# Patient Record
Sex: MALE | Race: WHITE | NOT HISPANIC OR LATINO | Employment: OTHER | URBAN - METROPOLITAN AREA
[De-identification: names, ages, dates, MRNs, and addresses within clinical notes are randomized per-mention and may not be internally consistent; named-entity substitution may affect disease eponyms.]

---

## 2017-01-03 ENCOUNTER — OFFICE VISIT (OUTPATIENT)
Dept: ADDICTION MEDICINE | Facility: CLINIC | Age: 42
End: 2017-01-03
Payer: COMMERCIAL

## 2017-01-03 VITALS
BODY MASS INDEX: 25.26 KG/M2 | TEMPERATURE: 98.5 F | RESPIRATION RATE: 20 BRPM | HEART RATE: 77 BPM | SYSTOLIC BLOOD PRESSURE: 136 MMHG | DIASTOLIC BLOOD PRESSURE: 84 MMHG | OXYGEN SATURATION: 95 % | WEIGHT: 181 LBS

## 2017-01-03 DIAGNOSIS — F10.20 ALCOHOL USE DISORDER, SEVERE, DEPENDENCE (H): Primary | ICD-10-CM

## 2017-01-03 PROCEDURE — 36415 COLL VENOUS BLD VENIPUNCTURE: CPT | Performed by: FAMILY MEDICINE

## 2017-01-03 PROCEDURE — 80076 HEPATIC FUNCTION PANEL: CPT | Performed by: FAMILY MEDICINE

## 2017-01-03 PROCEDURE — 99203 OFFICE O/P NEW LOW 30 MIN: CPT | Performed by: FAMILY MEDICINE

## 2017-01-03 RX ORDER — NALTREXONE HYDROCHLORIDE 50 MG/1
50 TABLET, FILM COATED ORAL DAILY
Qty: 30 TABLET | Refills: 3 | Status: SHIPPED | OUTPATIENT
Start: 2017-01-03 | End: 2019-09-07

## 2017-01-03 NOTE — PROGRESS NOTES
SUBJECTIVE:                                                    Foreign Gleason is a 41 year old male who presents to clinic today for the following health issues:          ADDICTION MEDICINE NOTE:    INITIAL VISIT  REFERRED BY PRIMARY, WILFRIDO LUND, WHO DOES NOT FEEL COMFORTABLE PRESCRIBING NALTREXONE  ALCOHOL DEPENDENCE   RECENT [place] DETOX ADMISSION  SEE H+P AND DISCHARGE SUMMARY  DUE TO POOR INSURANCE IS UNABLE TO GO TO TREATMENT   GETTING INDIVIDUAL COUNSELING  GOING TO SOME AA BUT DOESN'T LIKE TO HEAR STORIES OF PEOPLE DESCRIBING HOW BAD THEY WERE  GIVEN INFO RE: A MEETING HE MAY LIKE    WAS PRESCRIBED NALTREXONE, GABAPENTIN, TRAZODONE  DISCUSSED USE OF EACH  WILL CONTINUE AS HE'S DOING WELL  NO CRAVING    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  Additional history: as documented    Patient Active Problem List   Diagnosis     Hypercholesteremia     Alcohol use disorder, severe, dependence (H)     Alcohol dependence with withdrawal with complication (H)     Anxiety disorder, unspecified     No past surgical history on file.    Social History   Substance Use Topics     Smoking status: Never Smoker      Smokeless tobacco: Not on file     Alcohol Use: Yes      Comment: 500 ml per day     No family history on file.      Current Outpatient Prescriptions   Medication Sig Dispense Refill     naltrexone (DEPADE;REVIA) 50 MG tablet Take 1 tablet (50 mg) by mouth daily 30 tablet 3     gabapentin (NEURONTIN) 300 MG capsule Take 1 capsule (300 mg) by mouth 3 times daily 90 capsule 0     traZODone (DESYREL) 50 MG tablet Take 1 tablet (50 mg) by mouth nightly as needed for sleep 30 tablet 0     [DISCONTINUED] naltrexone (DEPADE;REVIA) 50 MG tablet Take 1 tablet (50 mg) by mouth daily 30 tablet 0     No Known Allergies  Problem list, Medication list, Allergies,  and Medical/Social/Surgical histories reviewed in [facility] and updated as appropriate.    ROS:      OBJECTIVE:                                                    /84 mmHg  Pulse 77  Temp(Src) 98.5  F (36.9  C) (Oral)  Resp 20  Wt 181 lb (82.101 kg)  SpO2 95%  Body mass index is 25.26 kg/(m^2).     ROS:  Constitutional, HEENT, cardiovascular, pulmonary, gi and gu systems are negative, except as otherwise noted.    /84 mmHg  Pulse 77  Temp(Src) 98.5  F (36.9  C) (Oral)  Resp 20  Wt 181 lb (82.101 kg)  SpO2 95%  EXAM:  GENERAL APPEARANCE: healthy, alert and no distress  EYES: Eyes grossly normal to inspection, PERRL and conjunctivae and sclerae normal  NEURO: Normal strength and tone, mentation intact and speech normal  PSYCH: mentation appears normal and affect normal/bright  MENTAL STATUS EXAM:  Appearance/Behavior: No apparent distress and Casually groomed  Speech: Normal  Mood/Affect: normal affect  Insight: Adequate      Diagnostic Test Results:  No results found for this or any previous visit (from the past 24 hour(s)).     ASSESSMENT:                                                    ALCOHOL DEPENDENCE , UNCOMPLICATED    PLAN:                                                        ICD-10-CM    1. Alcohol use disorder, severe, dependence (H) F10.20 naltrexone (DEPADE;REVIA) 50 MG tablet     Hepatic panel           MEDICATIONS:   Orders Placed This Encounter   Medications     naltrexone (DEPADE;REVIA) 50 MG tablet     Sig: Take 1 tablet (50 mg) by mouth daily     Dispense:  30 tablet     Refill:  3          - Continue other medications without change  FUTURE APPOINTMENTS:       - Follow-up visit in 2 MONTHS    Jt Hernandez MD  [facility]

## 2017-01-03 NOTE — MR AVS SNAPSHOT
"              After Visit Summary   1/3/2017    Foreign Gleason    MRN: [id number]           Patient Information     Date Of Birth          1975        Visit Information        Provider Department      1/3/2017 10:15 AM Jt Hernandez MD [place]        Today's Diagnoses     Alcohol use disorder, severe, dependence (H)    -  1        Follow-ups after your visit        Who to contact     If you have questions or need follow up information about today's clinic visit or your schedule please contact [facility] directly at [phone].  Normal or non-critical lab and imaging results will be communicated to you by Music Messenger (MM)hart, letter or phone within 4 business days after the clinic has received the results. If you do not hear from us within 7 days, please contact the clinic through Music Messenger (MM)hart or phone. If you have a critical or abnormal lab result, we will notify you by phone as soon as possible.  Submit refill requests through LiquidFrameworks or call your pharmacy and they will forward the refill request to us. Please allow 3 business days for your refill to be completed.          Additional Information About Your Visit        MyChart Information     LiquidFrameworks lets you send messages to your doctor, view your test results, renew your prescriptions, schedule appointments and more. To sign up, go to [email or web address] . Click on \"Log in\" on the left side of the screen, which will take you to the Welcome page. Then click on \"Sign up Now\" on the right side of the page.     You will be asked to enter the access code listed below, as well as some personal information. Please follow the directions to create your username and password.     Your access code is: [email or web address]  Expires: 2017  4:16 PM     Your access code will  in 90 days. If you need help or a new code, please call your [facility] or [phone].        Care EveryWhere ID     This is your " Care EveryWhere ID. This could be used by other organizations to access your [facility] medical records  [id number]        Your Vitals Were     Pulse Temperature Respirations Pulse Oximetry          77 98.5  F (36.9  C) (Oral) 20 95%         Blood Pressure from Last 3 Encounters:   01/03/17 136/84   12/04/16 127/84    Weight from Last 3 Encounters:   01/03/17 181 lb (82.101 kg)   11/30/16 182 lb (82.555 kg)              We Performed the Following     Hepatic panel          Where to get your medicines      These medications were sent to [facility]/pharmacy #[id number] - [place], [place] - [address]  [address], [place] [address]     Phone:  [phone]    - naltrexone 50 MG tablet       Primary Care Provider    Physician No Ref-Primary       No address on file        Thank you!     Thank you for choosing [facility] PRIMARY [facility]ARE  for your care. Our goal is always to provide you with excellent care. Hearing back from our patients is one way we can continue to improve our services. Please take a few minutes to complete the written survey that you may receive in the mail after your visit with us. Thank you!             Your Updated Medication List - Protect others around you: Learn how to safely use, store and throw away your medicines at www.disposemymeds.org.          This list is accurate as of: 1/3/17 10:49 AM.  Always use your most recent med list.                   Brand Name Dispense Instructions for use    gabapentin 300 MG capsule    NEURONTIN    90 capsule    Take 1 capsule (300 mg) by mouth 3 times daily       naltrexone 50 MG tablet    DEPADE;REVIA    30 tablet    Take 1 tablet (50 mg) by mouth daily       traZODone 50 MG tablet    DESYREL    30 tablet    Take 1 tablet (50 mg) by mouth nightly as needed for sleep

## 2017-01-04 LAB
ALBUMIN SERPL-MCNC: 3.7 G/DL (ref 3.4–5)
ALP SERPL-CCNC: 48 U/L (ref 40–150)
ALT SERPL W P-5'-P-CCNC: 31 U/L (ref 0–70)
AST SERPL W P-5'-P-CCNC: 14 U/L (ref 0–45)
BILIRUB DIRECT SERPL-MCNC: 0.2 MG/DL (ref 0–0.2)
BILIRUB SERPL-MCNC: 0.9 MG/DL (ref 0.2–1.3)
PROT SERPL-MCNC: 6.1 G/DL (ref 6.8–8.8)

## 2017-01-12 ENCOUNTER — COMMUNICATION - HEALTHEAST (OUTPATIENT)
Dept: INTERNAL MEDICINE | Facility: CLINIC | Age: 42
End: 2017-01-12

## 2017-01-12 DIAGNOSIS — F10.20 ALCOHOL USE DISORDER, SEVERE, DEPENDENCE (H): Primary | ICD-10-CM

## 2017-01-12 RX ORDER — GABAPENTIN 300 MG/1
300 CAPSULE ORAL 3 TIMES DAILY
Qty: 90 CAPSULE | Refills: 0 | Status: SHIPPED | OUTPATIENT
Start: 2017-01-12 | End: 2017-02-02

## 2017-01-12 RX ORDER — TRAZODONE HYDROCHLORIDE 50 MG/1
50 TABLET, FILM COATED ORAL
Qty: 30 TABLET | Refills: 0 | Status: SHIPPED | OUTPATIENT
Start: 2017-01-12 | End: 2017-03-10

## 2017-01-12 NOTE — TELEPHONE ENCOUNTER
Fax received 01/12/17    gabapentin (NEURONTIN) 300 MG      Last Written Prescription Date: 12/02/16  Last Fill Quantity: 90,  # refills: 0   Last Office Visit with [facility] or  Health prescribing provider: 01/03/17      traZODone (DESYREL) 50 MG        Last Written Prescription Date: 12/02/16  Last Fill Quantity: 30; # refills: 0  Last Office Visit with [facility] or  Health prescribing provider:  01/03/17     Last PHQ-9 score on record= No flowsheet data found.    AST       14   1/3/2017  ALT       31   1/3/2017

## 2017-01-20 ENCOUNTER — OFFICE VISIT - HEALTHEAST (OUTPATIENT)
Dept: INTERNAL MEDICINE | Facility: CLINIC | Age: 42
End: 2017-01-20

## 2017-01-20 DIAGNOSIS — F10.10 ALCOHOL ABUSE: ICD-10-CM

## 2017-01-20 ASSESSMENT — MIFFLIN-ST. JEOR: SCORE: 1696.39

## 2017-02-02 ENCOUNTER — COMMUNICATION - HEALTHEAST (OUTPATIENT)
Dept: INTERNAL MEDICINE | Facility: CLINIC | Age: 42
End: 2017-02-02

## 2017-02-02 DIAGNOSIS — F10.20 ALCOHOL USE DISORDER, SEVERE, DEPENDENCE (H): Primary | ICD-10-CM

## 2017-02-02 RX ORDER — GABAPENTIN 300 MG/1
300 CAPSULE ORAL 3 TIMES DAILY
Qty: 90 CAPSULE | Refills: 0 | Status: SHIPPED
Start: 2017-02-02 | End: 2017-03-03

## 2017-02-02 NOTE — TELEPHONE ENCOUNTER
Fax received from [facility] requesting a 90-day supply. Please fax form to [phone]  Writer placed form in provider's folder    gabapentin (NEURONTIN) 300 MG      Last Written Prescription Date: 01/12/16  Last Fill Quantity: 90,  # refills: 0   Last Office Visit with [facility], P or [facility] prescribing provider: 01/03/16                                         Next 5 appointments (look out 90 days)     Feb 28, 2017 10:00 AM   Return Visit with Jt Hernandez MD   [facility] ([facility])    [address] [place] [address]  [address]  [facility] [address] [phone]

## 2017-02-02 NOTE — TELEPHONE ENCOUNTER
Staff faxed signed Rx Request form for Gabapentin 300MG capsules to [facility] at [phone].      Pam Mendez MA

## 2017-02-10 ENCOUNTER — TELEPHONE (OUTPATIENT)
Dept: FAMILY MEDICINE | Facility: CLINIC | Age: 42
End: 2017-02-10

## 2017-02-10 NOTE — TELEPHONE ENCOUNTER
[facility].  [facility] stated that they received a order from Dr. Hernandez OK'ing a 90 day supply with 4 refills of Gabapentin.    They didn't have the instruction on hoe to take the medication and called to get them.      Writer reviewed Pt's cahrt and informed them it is 300 mg three times per day.      Will forward to Dr. Hernandez to OK refill as 270 caps (90 day Supply) with 4 refills.      Dr. Hernandez is this what you wanted pt to get?  Writer will call [facility] back after approval from you.    Navarro Gray RN

## 2017-02-10 NOTE — TELEPHONE ENCOUNTER
Incoming call from [employer] requesting instructions on Gabapentin rx written by Dr Hernandez 02/02/17.   Please follow up. Contact info: [phone]. Reference #: [id number]. Please follow up by the end of the day 02/13/17.    Joelle Dias

## 2017-02-21 ENCOUNTER — OFFICE VISIT - HEALTHEAST (OUTPATIENT)
Dept: INTERNAL MEDICINE | Facility: CLINIC | Age: 42
End: 2017-02-21

## 2017-02-21 DIAGNOSIS — F19.188: ICD-10-CM

## 2017-02-21 DIAGNOSIS — F10.10 ALCOHOL ABUSE: ICD-10-CM

## 2017-02-21 ASSESSMENT — MIFFLIN-ST. JEOR: SCORE: 1687.32

## 2017-02-28 ENCOUNTER — OFFICE VISIT (OUTPATIENT)
Dept: ADDICTION MEDICINE | Facility: CLINIC | Age: 42
End: 2017-02-28
Payer: COMMERCIAL

## 2017-02-28 VITALS
WEIGHT: 182.5 LBS | TEMPERATURE: 98.2 F | SYSTOLIC BLOOD PRESSURE: 142 MMHG | BODY MASS INDEX: 25.45 KG/M2 | OXYGEN SATURATION: 97 % | HEART RATE: 86 BPM | RESPIRATION RATE: 18 BRPM | DIASTOLIC BLOOD PRESSURE: 82 MMHG

## 2017-02-28 DIAGNOSIS — F10.20 ALCOHOL USE DISORDER, SEVERE, DEPENDENCE (H): Primary | ICD-10-CM

## 2017-02-28 PROCEDURE — 99214 OFFICE O/P EST MOD 30 MIN: CPT | Performed by: FAMILY MEDICINE

## 2017-02-28 NOTE — PROGRESS NOTES
SUBJECTIVE:                                                    Foreign Gleason is a 41 year old male who presents to clinic today for the following health issues:          ADDICTION MEDICINE NOTE:    I  REFERRED BY PRIMARY, WILFRIDO LUND, WHO DOES NOT FEEL COMFORTABLE PRESCRIBING NALTREXONE  ALCOHOL DEPENDENCE       DUE TO POOR INSURANCE IS UNABLE TO GO TO TREATMENT   GETTING INDIVIDUAL COUNSELING  GOING TO SOME AA BUT DOESN'T LIKE TO HEAR STORIES OF PEOPLE DESCRIBING HOW BAD THEY WERE  GIVEN INFO RE: A MEETING HE MAY LIKE    WAS PRESCRIBED NALTREXONE, GABAPENTIN, TRAZODONE  DISCUSSED USE OF EACH  WILL CONTINUE AS HE'S DOING WELL  NO CRAVING    NALTREXONE HELPING; REDUCED CRAVING  AGAIN DISCUSSED HOW IT WORKS  DISCUSSED CONCEPT OF MEMORY OF ALCOHOL FADING WITH TIME, THEN CRAVING AND DESIRE WILL FADE ALSO  GOES TO OCCASIONAL AA  METS WITH SPONSOR  INVOLVED WITH  CONCERNED FOR     GABAPENTIN NOW LESS NECESSARY AS POST -ACUTE WITHDRAWAL PERIOD FADING  SOME NAUSEA  WILL REDUCE DOSE  MG DAILY    CONTINUE SAME   RE-CHECK 3 MONTHS    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  Additional history: as documented    Patient Active Problem List   Diagnosis     Hypercholesteremia     Alcohol use disorder, severe, dependence (H)     Alcohol dependence with withdrawal with complication (H)     Anxiety disorder, unspecified     No past surgical history on file.    Social History   Substance Use Topics     Smoking status: Never Smoker     Smokeless tobacco: Not on file     Alcohol use Yes      Comment: 500 ml per day     No family history on file.      Current Outpatient Prescriptions   Medication Sig Dispense Refill     gabapentin (NEURONTIN) 300 MG capsule Take 1 capsule (300 mg) by mouth 3 times daily 90 capsule 0     traZODone (DESYREL) 50 MG tablet Take 1 tablet (50 mg) by mouth nightly as needed for sleep  30 tablet 0     naltrexone (DEPADE;REVIA) 50 MG tablet Take 1 tablet (50 mg) by mouth daily 30 tablet 3     No Known Allergies  Problem list, Medication list, Allergies, and Medical/Social/Surgical histories reviewed in [facility] and updated as appropriate.    ROS:      OBJECTIVE:                                                    /82  Pulse 86  Temp 98.2  F (36.8  C) (Oral)  Resp 18  Wt 182 lb 8 oz (82.8 kg)  SpO2 97%  BMI 25.45 kg/m2  Body mass index is 25.45 kg/(m^2).     ROS:  Constitutional, HEENT, cardiovascular, pulmonary, gi and gu systems are negative, except as otherwise noted.    /82  Pulse 86  Temp 98.2  F (36.8  C) (Oral)  Resp 18  Wt 182 lb 8 oz (82.8 kg)  SpO2 97%  BMI 25.45 kg/m2  EXAM:  GENERAL APPEARANCE: healthy, alert and no distress  EYES: Eyes grossly normal to inspection, PERRL and conjunctivae and sclerae normal  NEURO: Normal strength and tone, mentation intact and speech normal  PSYCH: mentation appears normal and affect normal/bright  MENTAL STATUS EXAM:  Appearance/Behavior: No apparent distress and Casually groomed  Speech: Normal  Mood/Affect: normal affect  Insight: Adequate      Diagnostic Test Results:  No results found for this or any previous visit (from the past 24 hour(s)).     ASSESSMENT:                                                    ALCOHOL DEPENDENCE , UNCOMPLICATED    PLAN:                                                      No diagnosis found.        MEDICATIONS:   No orders of the defined types were placed in this encounter.         - Continue other medications without change  FUTURE APPOINTMENTS:       - Follow-up visit in 3 MONTHS    Jt Hernandez MD  [facility]

## 2017-02-28 NOTE — MR AVS SNAPSHOT
"              After Visit Summary   2017    Foreign Gleason    MRN: [id number]           Patient Information     Date Of Birth          1975        Visit Information        Provider Department      2017 10:00 AM Jt Hernandez MD [facility]         Follow-ups after your visit        Who to contact     If you have questions or need follow up information about today's clinic visit or your schedule please contact [facility] PRIMARY [facility] directly at [phone].  Normal or non-critical lab and imaging results will be communicated to you by MyChart, letter or phone within 4 business days after the clinic has received the results. If you do not hear from us within 7 days, please contact the clinic through MyChart or phone. If you have a critical or abnormal lab result, we will notify you by phone as soon as possible.  Submit refill requests through Outitude or call your pharmacy and they will forward the refill request to us. Please allow 3 business days for your refill to be completed.          Additional Information About Your Visit        MyChart Information     Outitude lets you send messages to your doctor, view your test results, renew your prescriptions, schedule appointments and more. To sign up, go to [email or web address] . Click on \"Log in\" on the left side of the screen, which will take you to the Welcome page. Then click on \"Sign up Now\" on the right side of the page.     You will be asked to enter the access code listed below, as well as some personal information. Please follow the directions to create your username and password.     Your access code is: [email or web address]  Expires: 2017  4:16 PM     Your access code will  in 90 days. If you need help or a new code, please call your [facility] or [phone].        Care EveryWhere ID     This is your Care EveryWhere ID. This could be used by other organizations to access your " [facility] medical records  [id number]        Your Vitals Were     Pulse Temperature Respirations Pulse Oximetry BMI (Body Mass Index)       86 98.2  F (36.8  C) (Oral) 18 97% 25.45 kg/m2        Blood Pressure from Last 3 Encounters:   02/28/17 142/82   01/03/17 136/84    Weight from Last 3 Encounters:   02/28/17 182 lb 8 oz (82.8 kg)   01/03/17 181 lb (82.1 kg)              Today, you had the following     No orders found for display       Primary Care Provider    Physician No Ref-Primary       No address on file        Thank you!     Thank you for choosing [facility]ARE  for your care. Our goal is always to provide you with excellent care. Hearing back from our patients is one way we can continue to improve our services. Please take a few minutes to complete the written survey that you may receive in the mail after your visit with us. Thank you!             Your Updated Medication List - Protect others around you: Learn how to safely use, store and throw away your medicines at www.disposemymeds.org.          This list is accurate as of: 2/28/17 10:25 AM.  Always use your most recent med list.                   Brand Name Dispense Instructions for use    gabapentin 300 MG capsule    NEURONTIN    90 capsule    Take 1 capsule (300 mg) by mouth 3 times daily       naltrexone 50 MG tablet    DEPADE;REVIA    30 tablet    Take 1 tablet (50 mg) by mouth daily       traZODone 50 MG tablet    DESYREL    30 tablet    Take 1 tablet (50 mg) by mouth nightly as needed for sleep

## 2017-02-28 NOTE — NURSING NOTE
"No chief complaint on file.      Initial /82  Pulse 86  Temp 98.2  F (36.8  C) (Oral)  Resp 18  Wt 182 lb 8 oz (82.8 kg)  SpO2 97%  BMI 25.45 kg/m2 Estimated body mass index is 25.45 kg/(m^2) as calculated from the following:    Height as of 11/30/16: 5' 11\" (1.803 m).    Weight as of this encounter: 182 lb 8 oz (82.8 kg).  Medication Reconciliation: incomplete     Casi Hill MA      "

## 2017-03-03 DIAGNOSIS — F10.20 ALCOHOL USE DISORDER, SEVERE, DEPENDENCE (H): ICD-10-CM

## 2017-03-03 NOTE — TELEPHONE ENCOUNTER
gabapentin (NEURONTIN) 300 MG capsule  Controlled Substance Refill Request    Last refill: 2/2/17, Picked up from pharmacy 2/14/17 Per [place]    Last clinic visit: 2/28/17    Next appt: 5/30/17    Controlled substance agreement on file: No.    Documentation in problem list reviewed:  Yes    Processing:  E[facility]Sc[facility]    RX [place] ([place] reviewed:  reviewed- no concerns  [place]:  [email or web address]    Navarro Gray RN

## 2017-03-03 NOTE — TELEPHONE ENCOUNTER
Fax received 03/03/17    gabapentin (NEURONTIN) 300 MG      Last Written Prescription Date: 02/02/17  Last Fill Quantity: 90,  # refills: 0   Last Office Visit with G, P or [facility] prescribing provider: 02/28/17                                         Next 5 appointments (look out 90 days)     May 30, 2017 10:30 AM CDT   Return Visit with Jt Hernandez MD   [facility] ([facility])    [address] [place] [address]  [address]  [facility] [id number] [phone]

## 2017-03-05 RX ORDER — GABAPENTIN 300 MG/1
300 CAPSULE ORAL 3 TIMES DAILY
Qty: 90 CAPSULE | Refills: 0 | Status: SHIPPED | OUTPATIENT
Start: 2017-03-05 | End: 2017-04-25

## 2017-03-10 DIAGNOSIS — F10.20 ALCOHOL USE DISORDER, SEVERE, DEPENDENCE (H): ICD-10-CM

## 2017-03-10 RX ORDER — TRAZODONE HYDROCHLORIDE 50 MG/1
50 TABLET, FILM COATED ORAL
Qty: 30 TABLET | Refills: 0 | Status: SHIPPED | OUTPATIENT
Start: 2017-03-10 | End: 2019-09-07

## 2017-03-10 NOTE — TELEPHONE ENCOUNTER
Fax received 03/10/17    traZODone (DESYREL) 50 MG       Last Written Prescription Date: 01/12/17  Last Fill Quantity: 30; # refills: 0  Last Office Visit with [facility], P or [facility] prescribing provider:  02/28/17   Next 5 appointments (look out 90 days)     May 30, 2017 10:30 AM CDT   Return Visit with Jt Hernandez MD   [facility] ([facility])    [address] [place] [address]  [address]  [facility] [address] [phone]                   Last PHQ-9 score on record= No flowsheet data found.    Lab Results   Component Value Date    AST 14 01/03/2017     Lab Results   Component Value Date    ALT 31 01/03/2017

## 2017-03-10 NOTE — TELEPHONE ENCOUNTER
Routing refill request to provider for review/approval because:  Diagnosis not on the [facility] refill protocol Alcohol use disorder, severe, dependence (H) [F10.20]  - Primary     Thank you,  Carlos Montiel RN

## 2017-04-25 DIAGNOSIS — F10.20 ALCOHOL USE DISORDER, SEVERE, DEPENDENCE (H): ICD-10-CM

## 2017-04-26 RX ORDER — GABAPENTIN 300 MG/1
CAPSULE ORAL
Qty: 90 CAPSULE | Refills: 0 | Status: SHIPPED | OUTPATIENT
Start: 2017-04-26 | End: 2019-09-07

## 2017-04-26 NOTE — TELEPHONE ENCOUNTER
Controlled Substance Refill Request for Gabapentin    Last refill: 3/5/17, 90 tablets, 0 refills    Last clinic visit: 2/28/17     Next appt: 5/30/17    Controlled substance agreement on file: No.    Documentation in problem list reviewed:  Yes    Processing:  [facility] Rx [facility]    RX [place] reviewed:  reviewed- recommended provider review, it appears pt received 90 day supply of gabapentin from another provider, provider not listed  [place] profile:  [email or web address]  Thank you!  Kim Hopper RN

## 2017-05-15 ENCOUNTER — OFFICE VISIT - HEALTHEAST (OUTPATIENT)
Dept: INTERNAL MEDICINE | Facility: CLINIC | Age: 42
End: 2017-05-15

## 2017-05-15 DIAGNOSIS — F10.10 ALCOHOL ABUSE: ICD-10-CM

## 2017-05-15 DIAGNOSIS — N52.9 ED (ERECTILE DYSFUNCTION): ICD-10-CM

## 2017-05-15 ASSESSMENT — MIFFLIN-ST. JEOR: SCORE: 1728.14

## 2017-05-28 DIAGNOSIS — F10.20 ALCOHOL USE DISORDER, SEVERE, DEPENDENCE (H): ICD-10-CM

## 2017-05-29 DIAGNOSIS — F10.20 ALCOHOL USE DISORDER, SEVERE, DEPENDENCE (H): ICD-10-CM

## 2017-05-30 RX ORDER — GABAPENTIN 300 MG/1
CAPSULE ORAL
Qty: 90 CAPSULE | Refills: 0 | OUTPATIENT
Start: 2017-05-30

## 2017-05-30 RX ORDER — NALTREXONE HYDROCHLORIDE 50 MG/1
TABLET, FILM COATED ORAL
Qty: 30 TABLET | Refills: 3 | OUTPATIENT
Start: 2017-05-30

## 2017-05-30 NOTE — TELEPHONE ENCOUNTER
Naltrexone     Last Written Prescription Date: 1/3/17  Last Fill Quantity: 30,  # refills: 3   Last Office Visit with G, P or [facility] prescribing provider: 2/28/17                                            Thank you!  Kim Hopper RN

## 2017-05-30 NOTE — TELEPHONE ENCOUNTER
Controlled Substance Refill Request for Gabapentin    Last refill: 4/26/17, 90 tablets     Last clinic visit: 2/28/17    Next appt: N/A, cancelled appt scheduled for today     Controlled substance agreement on file: No.    Documentation in problem list reviewed:  Yes    Processing:  Fax Rx to pt's pharmacy    RX [place] reviewed:  reviewed- no concerns  [place] profile:  [email or web address]  Thank you!  Kim Hopper RN

## 2017-07-03 ENCOUNTER — COMMUNICATION - HEALTHEAST (OUTPATIENT)
Dept: INTERNAL MEDICINE | Facility: CLINIC | Age: 42
End: 2017-07-03

## 2018-05-05 ENCOUNTER — COMMUNICATION - HEALTHEAST (OUTPATIENT)
Dept: INTERNAL MEDICINE | Facility: CLINIC | Age: 43
End: 2018-05-05

## 2018-05-05 DIAGNOSIS — N52.9 ERECTILE DYSFUNCTION: ICD-10-CM

## 2018-07-20 ENCOUNTER — COMMUNICATION - HEALTHEAST (OUTPATIENT)
Dept: INTERNAL MEDICINE | Facility: CLINIC | Age: 43
End: 2018-07-20

## 2018-07-20 DIAGNOSIS — N52.9 ERECTILE DYSFUNCTION: ICD-10-CM

## 2018-09-20 ENCOUNTER — COMMUNICATION - HEALTHEAST (OUTPATIENT)
Dept: INTERNAL MEDICINE | Facility: CLINIC | Age: 43
End: 2018-09-20

## 2018-09-20 DIAGNOSIS — N52.9 ERECTILE DYSFUNCTION: ICD-10-CM

## 2018-11-13 ENCOUNTER — COMMUNICATION - HEALTHEAST (OUTPATIENT)
Dept: INTERNAL MEDICINE | Facility: CLINIC | Age: 43
End: 2018-11-13

## 2018-11-13 DIAGNOSIS — N52.9 ERECTILE DYSFUNCTION: ICD-10-CM

## 2019-09-07 ENCOUNTER — HOSPITAL ENCOUNTER (INPATIENT)
Facility: CLINIC | Age: 44
LOS: 2 days | Discharge: HOME | DRG: 897 | End: 2019-09-09
Attending: EMERGENCY MEDICINE | Admitting: PSYCHIATRY & NEUROLOGY
Payer: COMMERCIAL

## 2019-09-07 DIAGNOSIS — F41.9 ANXIETY DISORDER, UNSPECIFIED TYPE: ICD-10-CM

## 2019-09-07 DIAGNOSIS — F10.29 ALCOHOL DEPENDENCE WITH UNSPECIFIED ALCOHOL-INDUCED DISORDER (H): ICD-10-CM

## 2019-09-07 DIAGNOSIS — I10 BENIGN ESSENTIAL HYPERTENSION: Primary | ICD-10-CM

## 2019-09-07 PROBLEM — F10.939 ALCOHOL WITHDRAWAL WITH COMPLICATION WITH INPATIENT TREATMENT (H): Status: ACTIVE | Noted: 2019-09-07

## 2019-09-07 LAB
ALCOHOL BREATH TEST: 0.07 (ref 0–0.01)
AMPHETAMINES UR QL SCN: NEGATIVE
ANION GAP SERPL CALCULATED.3IONS-SCNC: 8 MMOL/L (ref 3–14)
BARBITURATES UR QL: NEGATIVE
BASOPHILS # BLD AUTO: 0 10E9/L (ref 0–0.2)
BASOPHILS NFR BLD AUTO: 1 %
BENZODIAZ UR QL: POSITIVE
BUN SERPL-MCNC: 10 MG/DL (ref 7–30)
CALCIUM SERPL-MCNC: 9.6 MG/DL (ref 8.5–10.1)
CANNABINOIDS UR QL SCN: NEGATIVE
CHLORIDE SERPL-SCNC: 100 MMOL/L (ref 94–109)
CO2 SERPL-SCNC: 30 MMOL/L (ref 20–32)
COCAINE UR QL: NEGATIVE
CREAT SERPL-MCNC: 0.74 MG/DL (ref 0.66–1.25)
DIFFERENTIAL METHOD BLD: ABNORMAL
EOSINOPHIL # BLD AUTO: 0.1 10E9/L (ref 0–0.7)
EOSINOPHIL NFR BLD AUTO: 1.8 %
ERYTHROCYTE [DISTWIDTH] IN BLOOD BY AUTOMATED COUNT: 11.7 % (ref 10–15)
ETHANOL UR QL SCN: NEGATIVE
GFR SERPL CREATININE-BSD FRML MDRD: >90 ML/MIN/{1.73_M2}
GGT SERPL-CCNC: 860 U/L (ref 0–75)
GLUCOSE SERPL-MCNC: 94 MG/DL (ref 70–99)
HCT VFR BLD AUTO: 45.8 % (ref 40–53)
HGB BLD-MCNC: 16.2 G/DL (ref 13.3–17.7)
IMM GRANULOCYTES # BLD: 0 10E9/L (ref 0–0.4)
IMM GRANULOCYTES NFR BLD: 0.3 %
LYMPHOCYTES # BLD AUTO: 1 10E9/L (ref 0.8–5.3)
LYMPHOCYTES NFR BLD AUTO: 26.5 %
MCH RBC QN AUTO: 34.8 PG (ref 26.5–33)
MCHC RBC AUTO-ENTMCNC: 35.4 G/DL (ref 31.5–36.5)
MCV RBC AUTO: 98 FL (ref 78–100)
MONOCYTES # BLD AUTO: 0.3 10E9/L (ref 0–1.3)
MONOCYTES NFR BLD AUTO: 7.7 %
NEUTROPHILS # BLD AUTO: 2.4 10E9/L (ref 1.6–8.3)
NEUTROPHILS NFR BLD AUTO: 62.7 %
NRBC # BLD AUTO: 0 10*3/UL
NRBC BLD AUTO-RTO: 0 /100
OPIATES UR QL SCN: NEGATIVE
PLATELET # BLD AUTO: 157 10E9/L (ref 150–450)
POTASSIUM SERPL-SCNC: 4.1 MMOL/L (ref 3.4–5.3)
RBC # BLD AUTO: 4.66 10E12/L (ref 4.4–5.9)
SODIUM SERPL-SCNC: 138 MMOL/L (ref 133–144)
WBC # BLD AUTO: 3.9 10E9/L (ref 4–11)

## 2019-09-07 PROCEDURE — 82977 ASSAY OF GGT: CPT | Performed by: EMERGENCY MEDICINE

## 2019-09-07 PROCEDURE — 99285 EMERGENCY DEPT VISIT HI MDM: CPT | Mod: 25 | Performed by: EMERGENCY MEDICINE

## 2019-09-07 PROCEDURE — 80307 DRUG TEST PRSMV CHEM ANLYZR: CPT | Performed by: EMERGENCY MEDICINE

## 2019-09-07 PROCEDURE — 25000132 ZZH RX MED GY IP 250 OP 250 PS 637: Performed by: PSYCHIATRY & NEUROLOGY

## 2019-09-07 PROCEDURE — 86709 HEPATITIS A IGM ANTIBODY: CPT | Performed by: EMERGENCY MEDICINE

## 2019-09-07 PROCEDURE — 80076 HEPATIC FUNCTION PANEL: CPT | Performed by: EMERGENCY MEDICINE

## 2019-09-07 PROCEDURE — 82075 ASSAY OF BREATH ETHANOL: CPT | Performed by: EMERGENCY MEDICINE

## 2019-09-07 PROCEDURE — 84443 ASSAY THYROID STIM HORMONE: CPT | Performed by: EMERGENCY MEDICINE

## 2019-09-07 PROCEDURE — 83735 ASSAY OF MAGNESIUM: CPT | Performed by: EMERGENCY MEDICINE

## 2019-09-07 PROCEDURE — 80320 DRUG SCREEN QUANTALCOHOLS: CPT | Performed by: EMERGENCY MEDICINE

## 2019-09-07 PROCEDURE — HZ2ZZZZ DETOXIFICATION SERVICES FOR SUBSTANCE ABUSE TREATMENT: ICD-10-PCS | Performed by: PSYCHIATRY & NEUROLOGY

## 2019-09-07 PROCEDURE — 12800008 ZZH R&B CD ADULT

## 2019-09-07 PROCEDURE — 86704 HEP B CORE ANTIBODY TOTAL: CPT | Performed by: EMERGENCY MEDICINE

## 2019-09-07 PROCEDURE — 25000132 ZZH RX MED GY IP 250 OP 250 PS 637: Performed by: HOSPITALIST

## 2019-09-07 PROCEDURE — 99283 EMERGENCY DEPT VISIT LOW MDM: CPT | Mod: Z6 | Performed by: EMERGENCY MEDICINE

## 2019-09-07 PROCEDURE — 80048 BASIC METABOLIC PNL TOTAL CA: CPT | Performed by: EMERGENCY MEDICINE

## 2019-09-07 PROCEDURE — 87340 HEPATITIS B SURFACE AG IA: CPT | Performed by: EMERGENCY MEDICINE

## 2019-09-07 PROCEDURE — 25000132 ZZH RX MED GY IP 250 OP 250 PS 637: Performed by: EMERGENCY MEDICINE

## 2019-09-07 PROCEDURE — 85025 COMPLETE CBC W/AUTO DIFF WBC: CPT | Performed by: EMERGENCY MEDICINE

## 2019-09-07 PROCEDURE — 86706 HEP B SURFACE ANTIBODY: CPT | Performed by: EMERGENCY MEDICINE

## 2019-09-07 PROCEDURE — 86803 HEPATITIS C AB TEST: CPT | Performed by: EMERGENCY MEDICINE

## 2019-09-07 RX ORDER — ATENOLOL 50 MG/1
50 TABLET ORAL DAILY PRN
Status: DISCONTINUED | OUTPATIENT
Start: 2019-09-07 | End: 2019-09-07

## 2019-09-07 RX ORDER — DIAZEPAM 10 MG
10 TABLET ORAL ONCE
Status: COMPLETED | OUTPATIENT
Start: 2019-09-07 | End: 2019-09-07

## 2019-09-07 RX ORDER — ALUMINA, MAGNESIA, AND SIMETHICONE 2400; 2400; 240 MG/30ML; MG/30ML; MG/30ML
30 SUSPENSION ORAL EVERY 4 HOURS PRN
Status: DISCONTINUED | OUTPATIENT
Start: 2019-09-07 | End: 2019-09-09 | Stop reason: HOSPADM

## 2019-09-07 RX ORDER — MULTIPLE VITAMINS W/ MINERALS TAB 9MG-400MCG
1 TAB ORAL DAILY
Status: DISCONTINUED | OUTPATIENT
Start: 2019-09-07 | End: 2019-09-09 | Stop reason: HOSPADM

## 2019-09-07 RX ORDER — HYDROXYZINE HYDROCHLORIDE 25 MG/1
25 TABLET, FILM COATED ORAL EVERY 4 HOURS PRN
Status: DISCONTINUED | OUTPATIENT
Start: 2019-09-07 | End: 2019-09-09 | Stop reason: HOSPADM

## 2019-09-07 RX ORDER — DIPHENHYDRAMINE HCL 25 MG
50 TABLET ORAL
COMMUNITY

## 2019-09-07 RX ORDER — ACETAMINOPHEN 325 MG/1
650 TABLET ORAL EVERY 4 HOURS PRN
Status: DISCONTINUED | OUTPATIENT
Start: 2019-09-07 | End: 2019-09-09 | Stop reason: HOSPADM

## 2019-09-07 RX ORDER — DIPHENHYDRAMINE HCL 25 MG
50 CAPSULE ORAL
Status: DISCONTINUED | OUTPATIENT
Start: 2019-09-07 | End: 2019-09-09 | Stop reason: HOSPADM

## 2019-09-07 RX ORDER — LOPERAMIDE HCL 2 MG
2 CAPSULE ORAL 4 TIMES DAILY PRN
Status: DISCONTINUED | OUTPATIENT
Start: 2019-09-07 | End: 2019-09-09 | Stop reason: HOSPADM

## 2019-09-07 RX ORDER — ATENOLOL 50 MG/1
50 TABLET ORAL DAILY PRN
Status: DISCONTINUED | OUTPATIENT
Start: 2019-09-07 | End: 2019-09-09 | Stop reason: HOSPADM

## 2019-09-07 RX ORDER — DIAZEPAM 5 MG
5-20 TABLET ORAL EVERY 30 MIN PRN
Status: DISCONTINUED | OUTPATIENT
Start: 2019-09-07 | End: 2019-09-09 | Stop reason: HOSPADM

## 2019-09-07 RX ORDER — LANOLIN ALCOHOL/MO/W.PET/CERES
100 CREAM (GRAM) TOPICAL DAILY
Status: DISCONTINUED | OUTPATIENT
Start: 2019-09-07 | End: 2019-09-09 | Stop reason: HOSPADM

## 2019-09-07 RX ORDER — CLONIDINE HYDROCHLORIDE 0.1 MG/1
0.1 TABLET ORAL 2 TIMES DAILY
Status: COMPLETED | OUTPATIENT
Start: 2019-09-07 | End: 2019-09-08

## 2019-09-07 RX ORDER — FOLIC ACID 1 MG/1
1 TABLET ORAL DAILY
Status: DISCONTINUED | OUTPATIENT
Start: 2019-09-07 | End: 2019-09-09 | Stop reason: HOSPADM

## 2019-09-07 RX ORDER — BISACODYL 10 MG
10 SUPPOSITORY, RECTAL RECTAL DAILY PRN
Status: DISCONTINUED | OUTPATIENT
Start: 2019-09-07 | End: 2019-09-09 | Stop reason: HOSPADM

## 2019-09-07 RX ADMIN — DIAZEPAM 10 MG: 10 TABLET ORAL at 12:19

## 2019-09-07 RX ADMIN — DIAZEPAM 10 MG: 5 TABLET ORAL at 16:44

## 2019-09-07 RX ADMIN — DIPHENHYDRAMINE HYDROCHLORIDE 50 MG: 25 CAPSULE ORAL at 22:07

## 2019-09-07 RX ADMIN — MULTIPLE VITAMINS W/ MINERALS TAB 1 TABLET: TAB at 18:04

## 2019-09-07 RX ADMIN — CLONIDINE HYDROCHLORIDE 0.1 MG: 0.1 TABLET ORAL at 22:07

## 2019-09-07 RX ADMIN — FOLIC ACID 1 MG: 1 TABLET ORAL at 18:08

## 2019-09-07 RX ADMIN — Medication 100 MG: at 18:05

## 2019-09-07 RX ADMIN — DIAZEPAM 10 MG: 5 TABLET ORAL at 20:45

## 2019-09-07 ASSESSMENT — ACTIVITIES OF DAILY LIVING (ADL)
HYGIENE/GROOMING: INDEPENDENT
DRESS: STREET CLOTHES;INDEPENDENT
ORAL_HYGIENE: INDEPENDENT
LAUNDRY: WITH SUPERVISION

## 2019-09-07 NOTE — ED NOTES
ED to Behavioral Floor Handoff    SITUATION  Foreign Gleason is a 44 year old male who speaks English and lives in a home with family members The patient arrived in the ED by private car from home with a complaint of Alcohol Problem (Here for detox, frinks 500-600 ml vodka/day, last drink was at 1 am today, no Seizure hx. Patient has a bed in detox on hold for him.)  .The patient's current symptoms started/worsened 5 hour(s) ago and during this time the symptoms have increased.   In the ED, pt was diagnosed with   Final diagnoses:   Alcohol dependence with unspecified alcohol-induced disorder (H)        Initial vitals were: BP: (!) 168/109  Pulse: 88  Temp: 98.4  F (36.9  C)  Resp: 16  Weight: 88.1 kg (194 lb 3.2 oz)  SpO2: 98 %   --------  Is the patient diabetic? No   If yes, last blood glucose? --     If yes, was this treated in the ED? --  --------  Is the patient inebriated (ETOH) no   or Impaired on other substances? No  MSSA done? Yes  Last MSSA score: -- 5   Were withdrawal symptoms treated? Yes  Does the patient have a seizure history? No. If yes, date of most recent seizure--  --------  Is the patient patient experiencing suicidal ideation? denies current or recent suicidal ideation     Homicidal ideation? denies current or recent homicidal ideation or behaviors.    Self-injurious behavior/urges? denies current or recent self injurious behavior or ideation.  ------  Was pt aggressive in the ED No  Was a code called No  Is the pt now cooperative? Yes  -------  Meds given in ED:   Medications   diazepam (VALIUM) tablet 10 mg (10 mg Oral Given 9/7/19 1219)      Family present during ED course? No  Family currently present? No    BACKGROUND  Does the patient have a cognitive impairment or developmental disability? No  Allergies: No Known Allergies.   Social demographics are   Social History     Socioeconomic History     Marital status:      Spouse name: None     Number of children: None     Years of  education: None     Highest education level: None   Occupational History     None   Social Needs     Financial resource strain: None     Food insecurity:     Worry: None     Inability: None     Transportation needs:     Medical: None     Non-medical: None   Tobacco Use     Smoking status: Never Smoker     Smokeless tobacco: Never Used   Substance and Sexual Activity     Alcohol use: Yes     Comment: 500 ml per day vodka     Drug use: No     Sexual activity: None   Lifestyle     Physical activity:     Days per week: None     Minutes per session: None     Stress: None   Relationships     Social connections:     Talks on phone: None     Gets together: None     Attends Confucianist service: None     Active member of club or organization: None     Attends meetings of clubs or organizations: None     Relationship status: None     Intimate partner violence:     Fear of current or ex partner: None     Emotionally abused: None     Physically abused: None     Forced sexual activity: None   Other Topics Concern     None   Social History Narrative     None        ASSESSMENT  Labs results   Labs Ordered and Resulted from Time of ED Arrival Up to the Time of Departure from the ED   CBC WITH PLATELETS DIFFERENTIAL - Abnormal; Notable for the following components:       Result Value    WBC 3.9 (*)     MCH 34.8 (*)     All other components within normal limits   ALCOHOL BREATH TEST POCT - Abnormal; Notable for the following components:    Alcohol Breath Test 0.069 (*)     All other components within normal limits   BASIC METABOLIC PANEL      Imaging Studies: No results found for this or any previous visit (from the past 24 hour(s)).   Most recent vital signs BP (!) 160/114   Pulse 98   Temp 97.6  F (36.4  C) (Oral)   Resp 16   Wt 88.1 kg (194 lb 3.2 oz)   SpO2 95%   BMI 27.09 kg/m     Abnormal labs/tests/findings requiring intervention:---   Pain control: pt had none  Nausea control: pt had none    RECOMMENDATION  Are any  infection precautions needed (MRSA, VRE, etc.)? No If yes, what infection? --  ---  Does the patient have mobility issues? independently. If yes, what device does the pt use? ---  ---  Is patient on 72 hour hold or commitment? No If on 72 hour hold, have hold and rights been given to patient? N/A  Are admitting orders written if after 10 p.m. ?N/A  Tasks needing to be completed:---     Mag Liriano RN   [facility]--  none  [phone] [facility]

## 2019-09-07 NOTE — PHARMACY-ADMISSION MEDICATION HISTORY
Admission Medication History Completed by Pharmacy    Sources: Patient    Medication Adherence: n/a    Current Outpatient Pharmacy: [facility][id number] - [place], [place] - [address]     Pertinent Changes:  Deleted: trazodone 50mg PO HS PRN sleep --> prescription from 2017     Prior to Admission Medication List:  Prior to Admission Medications   Prescriptions Last Dose Informant     diphenhydrAMINE (BENADRYL) 25 MG tablet Past Week Self     Sig: Take 50 mg by mouth nightly as needed for sleep           Time spent: 5 minutes  -----------  Joseline Moreno, Pharm.D., [facility]  Behavioral Health Pharmacist  [facility]  Ascom: *[phone] or [phone]

## 2019-09-07 NOTE — PROGRESS NOTES
09/07/19 1621   Patient Belongings   Did you bring any home meds/supplements to the hospital?  No   Patient Belongings other (see comments)   Belongings Search Yes   Clothing Search Yes   Second Staff Malik   Comment See notes.    Storage bin: shoes, belt, wallet, books, set of keys, bag, small tablet, chargers.  Discharge bin: cell phone.   Security envelope: [id number]  A             Admission:  I am responsible for any personal items that are not sent to the safe or pharmacy.  [facility] is not responsible for loss, theft or damage of any property in my possession.  Signature:  _________________________________ Date: ________ Time: ______                                          Staff Signature:  ____________________________ Date: _________  Time: ______      2nd Staff person, if patient is unable/unwilling to sign:    Signature: ________________________________ Date: _________  Time: ______     Discharge:  [facility] has returned all of my personal belongings:  Signature: _________________________________ Date: _________ Time: ______     Staff Signature:  ____________________________ Date: _________  Time: ______

## 2019-09-07 NOTE — PLAN OF CARE
"S:  Foreign is a 43 yo M who comes to 3[facility] seeking detox from alcohol.  He reports that he has been drinking \"500-600 ml of Vodka for the last year and a half.  He states that his last drink was today (9/7/19) at 01:00 am.  He denies using any other substances.    He denies any thoughts of self harm, suicide or thoughts of harming others. He lives in a house with his wife, Maia and his three and a half year old daughter, Claudia.  He receives emotional support from his parents, his Aunt Claudia Barba and his wife.  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  His BP has been significantly elevated throughout the time he has been here this evening.  B:  Epic indicates Hypercholesterolemia and SA.  He has been to detox once before in 2016.  He has never been to treatment.  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   A:  Pt in moderate alcohol withdrawal AEB MSSA scores of 10 & 10.  R:  Obtained admission orders.  Offered fluids, encouraged increased fluid intake, provided with a nutritious meal.  Re-oriented to unit, schedule and POC.  Re-introduced to the [facility] Treatment Program and accompanying paperwork.  Administered a MVI, thiamine 100 mg, folic acid 1 mg and diazepam 10 mg X  2. Contacted the after hours House Officer to notify him of elevated BP readings.  Received new orders for clonidine and a modification of the pre-existing order for atenolol.  Administered one dose of clonidine 0.1 mg. Administered diphenhydramine 50 mg to help with sleep.  Continue to monitor and medicate as ordered and indicated.    "

## 2019-09-08 LAB
ALBUMIN SERPL-MCNC: 4.6 G/DL (ref 3.4–5)
ALP SERPL-CCNC: 81 U/L (ref 40–150)
ALT SERPL W P-5'-P-CCNC: 399 U/L (ref 0–70)
AST SERPL W P-5'-P-CCNC: 189 U/L (ref 0–45)
BILIRUB DIRECT SERPL-MCNC: 0.4 MG/DL (ref 0–0.2)
BILIRUB SERPL-MCNC: 1.3 MG/DL (ref 0.2–1.3)
MAGNESIUM SERPL-MCNC: 1.9 MG/DL (ref 1.6–2.3)
PROT SERPL-MCNC: 8.3 G/DL (ref 6.8–8.8)
TSH SERPL DL<=0.005 MIU/L-ACNC: 1.41 MU/L (ref 0.4–4)

## 2019-09-08 PROCEDURE — 86704 HEP B CORE ANTIBODY TOTAL: CPT | Performed by: PHYSICIAN ASSISTANT

## 2019-09-08 PROCEDURE — 99222 1ST HOSP IP/OBS MODERATE 55: CPT | Mod: AI | Performed by: PSYCHIATRY & NEUROLOGY

## 2019-09-08 PROCEDURE — 99207 ZZC CDG-CODE CATEGORY CHANGED: CPT | Performed by: PHYSICIAN ASSISTANT

## 2019-09-08 PROCEDURE — 12800008 ZZH R&B CD ADULT

## 2019-09-08 PROCEDURE — 80076 HEPATIC FUNCTION PANEL: CPT | Performed by: PHYSICIAN ASSISTANT

## 2019-09-08 PROCEDURE — 25000132 ZZH RX MED GY IP 250 OP 250 PS 637: Performed by: PSYCHIATRY & NEUROLOGY

## 2019-09-08 PROCEDURE — 25000132 ZZH RX MED GY IP 250 OP 250 PS 637: Performed by: HOSPITALIST

## 2019-09-08 PROCEDURE — 99222 1ST HOSP IP/OBS MODERATE 55: CPT | Performed by: PHYSICIAN ASSISTANT

## 2019-09-08 PROCEDURE — 25000132 ZZH RX MED GY IP 250 OP 250 PS 637: Performed by: PHYSICIAN ASSISTANT

## 2019-09-08 RX ORDER — CLONIDINE HYDROCHLORIDE 0.1 MG/1
0.1 TABLET ORAL 2 TIMES DAILY PRN
Status: DISCONTINUED | OUTPATIENT
Start: 2019-09-09 | End: 2019-09-09 | Stop reason: HOSPADM

## 2019-09-08 RX ADMIN — DIAZEPAM 5 MG: 5 TABLET ORAL at 08:58

## 2019-09-08 RX ADMIN — HYDROXYZINE HYDROCHLORIDE 25 MG: 25 TABLET, FILM COATED ORAL at 04:09

## 2019-09-08 RX ADMIN — DIAZEPAM 10 MG: 5 TABLET ORAL at 12:53

## 2019-09-08 RX ADMIN — MULTIPLE VITAMINS W/ MINERALS TAB 1 TABLET: TAB at 08:14

## 2019-09-08 RX ADMIN — DIPHENHYDRAMINE HYDROCHLORIDE 50 MG: 25 CAPSULE ORAL at 22:29

## 2019-09-08 RX ADMIN — CLONIDINE HYDROCHLORIDE 0.1 MG: 0.1 TABLET ORAL at 08:14

## 2019-09-08 RX ADMIN — DIAZEPAM 5 MG: 5 TABLET ORAL at 16:13

## 2019-09-08 RX ADMIN — Medication 100 MG: at 08:14

## 2019-09-08 RX ADMIN — DIAZEPAM 10 MG: 5 TABLET ORAL at 00:18

## 2019-09-08 RX ADMIN — HYDROXYZINE HYDROCHLORIDE 25 MG: 25 TABLET, FILM COATED ORAL at 00:18

## 2019-09-08 RX ADMIN — CLONIDINE HYDROCHLORIDE 0.1 MG: 0.1 TABLET ORAL at 20:05

## 2019-09-08 RX ADMIN — FOLIC ACID 1 MG: 1 TABLET ORAL at 08:14

## 2019-09-08 NOTE — PROGRESS NOTES
Case Management Note    Writer met with patient to initiate discharge planning.  He states that he has been to detox once in the past but that he would like to approach his aftercare plan differently.  He states that after detox last time he got a therapist, but did not do enough research about it and that this time he would like to be referred to [facility].  He reports they have outpatient treatment there.  He signed a release which is in his chart.  He was directed to begin working on paperwork in his folder and a  would be able to meet with him on Monday

## 2019-09-08 NOTE — PROGRESS NOTES
"SPIRITUAL HEALTH SERVICES  SPIRITUAL ASSESSMENT Progress Note  [facility]     REFERRAL SOURCE: [facility] consult order for emotional support    Visited with pt Spiritism. He reflected on the following themes:    Family Dynamics. He returned regularly in our conversation to challenges in his relationship with his wife and his concern that she wants him to do a long inpatient treatment program and his desire to do something \"less intense.\" Encouraged him to be honest with himself in evaluating what will be most helpful to him in focusing on the life he wants.     Darcie Community. Spiritism said he is a \"Luz Maria and Easter Latter day\" and \"not very spiritual.\" He said he has connected most with [employer] churches. He said that since moving to the [place] 3 years ago he has had trouble connecting to a community here. Gave him the names of 3 [employer] churches in [place] and [place] and encouraged him to engage these communities in ways that worked for him (book groups, discussion groups, etc. Instead of Sabianist services.)     Spiritual Practices. In discussing what helps Spiritism feel grounded and connected to his sense of self, introduced The Examen as a daily practice to reflect on what is life-giving and what is not.     Spiritism describes himself as \"evidence-based\" and says he always wants to \"see the evidence or read the study\" before getting on board with an intervention. He is an  and we reflected on finding spiritual themes in the mysteries of mathematics.     PLAN: Will notify unit  of care provided. [facility] remains available for any further needs or requests.     DEREK Torrez.  Associate    Pager [phone]    * [facility] remains available 24/7 for emergent requests/referrals, either by having the switchboard page the on-call  or by entering an ASAP/STAT consult in Epic (this will also page the on-call ).*     "

## 2019-09-08 NOTE — ED PROVIDER NOTES
History     Chief Complaint   Patient presents with     Alcohol Problem     Here for detox, frinks 500-600 ml vodka/day, last drink was at 1 am today, no Seizure hx. Patient has a bed in detox on hold for him.     LUANA Gleason is a 44 year old male who presents to the ER requesting detox.  He states that he drinks approximately half a liter of vodka per day with his last drink occurring at about 1 AM this morning.  He has no prior history of seizures or DTs but does have a history of withdrawal. He is requesting detox for symptoms of withdrawal.  He called ahead and has a detox bed on hold available for him.  He has no nausea, vomiting or pain.      Allergies:  Allergies   Allergen Reactions     Green Tea Lantry Ext Other (See Comments) and Anaphylaxis     Says only happens with the brand Stash, throat swelling       Problem List:    Patient Active Problem List    Diagnosis Date Noted     Alcohol withdrawal with complication with inpatient treatment (H) 09/07/2019     Priority: Medium     Anxiety disorder, unspecified 12/02/2016     Priority: Medium     Alcohol dependence with withdrawal with complication (H) 12/01/2016     Priority: Medium     Alcohol use disorder, severe, dependence (H) 11/30/2016     Priority: Medium     Hypercholesteremia      Priority: Medium        Past Medical History:    Past Medical History:   Diagnosis Date     Hypercholesteremia      Substance abuse (H)        Past Surgical History:    History reviewed. No pertinent surgical history.    Family History:    No family history on file.    Social History:  Marital Status:   [2]  Social History     Tobacco Use     Smoking status: Never Smoker     Smokeless tobacco: Never Used   Substance Use Topics     Alcohol use: Yes     Comment: 500 ml per day vodka     Drug use: No        Medications:      No current outpatient medications on file.      Review of Systems   All other systems reviewed and are negative.      Physical Exam   BP:  "(!) 168/109  Pulse: 88  Temp: 98.4  F (36.9  C)  Resp: 16  Height: 177.8 cm (5' 10\")  Weight: 88.1 kg (194 lb 3.2 oz)  SpO2: 98 %      Physical Exam   Constitutional: He is oriented to person, place, and time. No distress.   HENT:   Head: Normocephalic and atraumatic.   Neck: Normal range of motion. Neck supple.   Cardiovascular: Normal rate.   Pulmonary/Chest: Effort normal. No stridor. No respiratory distress. He has no rales.   Abdominal: Soft. He exhibits no distension. There is no tenderness. There is no rebound.   Musculoskeletal: Normal range of motion.   Neurological: He is alert and oriented to person, place, and time. No sensory deficit.   Skin: Skin is warm and dry. He is not diaphoretic.   Psychiatric: He has a normal mood and affect. His behavior is normal. Thought content normal.   Nursing note and vitals reviewed.      ED Course        Procedures               Critical Care time:  none               Results for orders placed or performed during the hospital encounter of 09/07/19 (from the past 24 hour(s))   Alcohol breath test POCT   Result Value Ref Range    Alcohol Breath Test 0.069 (A) 0.00 - 0.01   CBC with platelets differential   Result Value Ref Range    WBC 3.9 (L) 4.0 - 11.0 10e9/L    RBC Count 4.66 4.4 - 5.9 10e12/L    Hemoglobin 16.2 13.3 - 17.7 g/dL    Hematocrit 45.8 40.0 - 53.0 %    MCV 98 78 - 100 fl    MCH 34.8 (H) 26.5 - 33.0 pg    MCHC 35.4 31.5 - 36.5 g/dL    RDW 11.7 10.0 - 15.0 %    Platelet Count 157 150 - 450 10e9/L    Diff Method Automated Method     % Neutrophils 62.7 %    % Lymphocytes 26.5 %    % Monocytes 7.7 %    % Eosinophils 1.8 %    % Basophils 1.0 %    % Immature Granulocytes 0.3 %    Nucleated RBCs 0 0 /100    Absolute Neutrophil 2.4 1.6 - 8.3 10e9/L    Absolute Lymphocytes 1.0 0.8 - 5.3 10e9/L    Absolute Monocytes 0.3 0.0 - 1.3 10e9/L    Absolute Eosinophils 0.1 0.0 - 0.7 10e9/L    Absolute Basophils 0.0 0.0 - 0.2 10e9/L    Abs Immature Granulocytes 0.0 0 - 0.4 " 10e9/L    Absolute Nucleated RBC 0.0    Basic metabolic panel   Result Value Ref Range    Sodium 138 133 - 144 mmol/L    Potassium 4.1 3.4 - 5.3 mmol/L    Chloride 100 94 - 109 mmol/L    Carbon Dioxide 30 20 - 32 mmol/L    Anion Gap 8 3 - 14 mmol/L    Glucose 94 70 - 99 mg/dL    Urea Nitrogen 10 7 - 30 mg/dL    Creatinine 0.74 0.66 - 1.25 mg/dL    GFR Estimate >90 >60 mL/min/[1.73_m2]    GFR Estimate If Black >90 >60 mL/min/[1.73_m2]    Calcium 9.6 8.5 - 10.1 mg/dL   GGT   Result Value Ref Range     (H) 0 - 75 U/L   Drug abuse screen 6 urine (tox)   Result Value Ref Range    Amphetamine Qual Urine Negative NEG^Negative    Barbiturates Qual Urine Negative NEG^Negative    Benzodiazepine Qual Urine Positive (A) NEG^Negative    Cannabinoids Qual Urine Negative NEG^Negative    Cocaine Qual Urine Negative NEG^Negative    Ethanol Qual Urine Negative NEG^Negative    Opiates Qualitative Urine Negative NEG^Negative         Assessments & Plan (with Medical Decision Making)   This is a 43 yo M who presents to the ER requesting detox. His lab evaluation who had a mild elevation in , and .  Foreign is medically cleared for detox.  He will be admitted to the detox service for alcohol dependence.       I have reviewed the nursing notes.    I have reviewed the findings, diagnosis, plan and need for follow up with the patient.       Current Discharge Medication List          Final diagnoses:   Alcohol dependence with unspecified alcohol-induced disorder (H)       9/7/2019   [facility]     Seth Gonzales MD  09/15/19 1994

## 2019-09-08 NOTE — H&P
"[facility]  Psychiatric History and Physical      Patient name: Foreign Gleason    MRN: [id number]  Age: 44 year old    YOB: 1975    Identifying information:   The patient is a 44 year old  male who is  and employed.     Chief complaint:  \"I haven't been able to stop drinking.     History of present illness: The patient is a history of alcohol use disorder who presented voluntarily to the emergency room seeking detox from alcohol.  He presented with an alcohol breath test of 0.0694 reporting drinking 500 mL of vodka per day over the past 1 year.  He attributes his ongoing usage to suspected depressed mood stemming from an accumulation of various psychosocial stressors.  No recent acute stressors reported as being related to his current presentation.  Progressive usage of alcohol is reported to be related to tolerance.  He denied any other illicit substance usage.  His treatment goals are to safely detox from alcohol and transition to an outpatient MI CD treatment program.  Mood is depressed, characterized as mild,    Psychiatric Review of Systems:    -- Depressive episode: Mostly in regards to the regret and remorse regarding ongoing alcohol usage and inability to stop, low energy, fair appetite, poor sleep, denial of anhedonia, denial of suicidal and homicidal thoughts.  He denied feeling helpless or hopeless.  -- Urvashi:  denies symptoms  -- Psychosis:  denies symptoms  -- Anxiety: denies symptoms corresponding to YOHAN or panic disorder  -- PTSD: denies symptoms  -- OCD: denies  symptoms  -- Eating disorder: denies symptoms    Psychiatric History:    No prior inpatient hospitalizations.  No prior treatment of mental illness.  No history of suicide attempts.  He had previously seen a psychologist for psychotherapy however did not benefit from the mode of therapy used.  He is also seen a marriage and family therapist in the past.    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  His usage continued afterwards and progressively worsened to a point of becoming problematic, mostly due to withdrawal symptoms and relational conflicts.  He identified progressive usage, loss of control over usage, drinking to the point of blackout and intoxication, and various interpersonal conflicts related to his usage.  He detoxed himself a few times at home eventually leading to his first admission to detox in 2016.  This is his second admission to detox.  No history of withdrawal seizures or DTs.  He denied illicit substance usage.  He has been treated with naltrexone in the past and is ambivalent regarding whether he gained a positive response or not.    Medical History: No active issues.  The rest per internal medicine consult.      No current facility-administered medications on file prior to encounter.   Current Outpatient Medications on File Prior to Encounter:  diphenhydrAMINE (BENADRYL) 25 MG tablet Take 50 mg by mouth nightly as needed for sleep        Social History:  Refer to the psychosocial assessment completed by our .  He is , employed, resides at home with his wife and their daughter.     Family History:    Notable for alcohol addiction.  No knowledge of bipolar disorder or suicides.    Medical review of systems: 10 systems were reviewed and positive for psychiatric symptoms as noted above otherwise negative    Physical Exam:    B/P: 160/114, T: 96.9, P: 74, R: 16  Estimated body mass index is 27.86 kg/m  as calculated from the following:    Height as of this encounter: 1.778 m (5' 10\").    Weight as of this encounter: 88.1 kg (194 lb 3.2 oz).    The rest of the physical examination was reviewed in the emergency room note completed by the emergency room physician.      Mental status examination:  Appearance:  Alert, fair hygiene, no acute distress  Attitude:  " Attempts to be cooperative  Eye Contact: Fair  Mood:  Depressed, mild  Affect: Mood congruent and blunted  Speech:  Normal rates, tone, latency, volume. Not pushed or pressured.  Psychomotor Behavior:  No psychomotor abnormalities noted  Thought Process: Linear and logical; not tangential or circumstantial or disorganized  Associations:  Logical; no loose associations Noted  Thought Content:  No obvious paranoia, delusions, ideas of reference, or grandiosity noted. Denies auditory or visual hallucinations. Denies suicidal Ideations. Denies homicidal ideations.  Insight:  Fair  Judgment:  Fair  Oriented to:  time, person, and place  Attention Span and Concentration:  Intact  Recent and Remote Memory: Intact based on interviewing and details provided  Language: Appropriate based on interviewing  Fund of Knowledge: Appropriate based on interviewing  Muscle Strength and Tone: Normal upon visual inspection  Gait and Station: Normal upon visual inspection            Diagnoses:    Alcohol use disorder, severe (dependence and withdrawal)  Unspecified depressive disorder (determine if substance-induced or related to a primary underlying mood disorder)     Plan:  1.  The patient has been admitted to unit 3A voluntarily to detox from alcohol.  His primary treatment team will resume his care Monday morning.    2.  [facility] protocol with Valium for management of alcohol withdrawal symptoms.  Consider reevaluating his mood as he progresses through detox to determine if there is an underlying depressive disorder which could benefit from treatment with an antidepressant and/or psychotherapy.    3. Psychosocial treatments to be addressed with social work consult and groups.  He is interested in pursuing an outpatient MI/CD treatment program.

## 2019-09-08 NOTE — CONSULTS
General acute hospital, [place]  Consult Note - Hospitalist Service     Date of Admission:  9/7/2019  Consult Requested by: Dr. Queen  Reason for Consult: Medical co-management    Assessment & Plan   Foreign Gleason is a 44 year old male with a history of HLD and alcohol abuse, who was admitted to [facility] on 9/7/19 seeking detoxification from alcohol.     Alcohol abuse with acute withdrawal: Drinking 05 liter of vodka per day. Last drink 0100 on 9/7. Utox in the ED + for benzodiazepines, KEYANA 0.069.  No history of withdrawal seizures and DTs. MSSA currently 8.    - Continue on MSSA protocol with benzodiazepines as indicated   - Management per Psychiatry   - Agree with MVI, folic acid, and thiamine supplements    Elevated blood pressure: BP persistently elevated since admission with max SBP of 184. No prior diagnosis of hypertension, BP typically 135-140 at home. Most likely due to acute alcohol withdrawal syndrome vs underlying essential hypertension.    - Continue clonidine 0.1 mg BID through today, then transition to PRN tomorrow   - If BP is consistently >140/90 once he has completed withdrawal then will need to consider starting a daily antihypertensive medication    Transaminitis: ,  with direct bili 0.4, Tbili and alk phos wnl. LFTs last checked in 2017 and were wnl. No hepatitis risk factors, does not use tylenol regularly and not on any hepatotoxic medications PTA. Most likely due to alcohol abuse though not elevated in typical 2:1 pattern.    - Recheck hepatic panel tomorrow   - Hepatitis screening pending    Hx of dyslipidemia: Maintained on atorvastatin 10 mg daily in the past, discontinued several months ago.       Medicine will follow blood pressure and results of hepatic panel. Please do not hesitate to contact if new questions or concerns arise.     Suri Beasley PA-C  General acute hospital, [place]  Hospitalist Service  Pager:  [phone]    ______________________________________________________________________    Chief Complaint   Alcohol withdrawal     History is obtained from the patient    History of Present Illness   Foreign Gleason is a 44 year old male with a history of HLD and alcohol abuse, who was admitted to [facility] on 9/7/19 seeking detoxification from alcohol. He has been drinking about 1/2 liter of vodka daily, last drink was at around 0100 on 9/7. No other substance abuse. Denies a history of withdrawal seizures or DTs. His LFTs are quite elevated here. He denies a known history of liver dysfunction. No IVDU, is not sexually active. No regular tylenol use. Actually does not take any medications on a regular basis at home.     He is concerned about his blood pressure. It has been elevated here. He checks it occasionally with a home cuff and reports SBP of 135-140. Has never been formally diagnosed with hypertension - notes BP varies widely in clinic from 110s to 140s. Denies any chest pain/pressure, palpitations, dyspnea, exertional symptoms, leg swelling. No headache, vision changes, weakness, lightheadedness/dizziness.     He otherwise is healthy with no ongoing medical problems. Has had high cholesterol in the past, was on a statin and discontinued this for unclear reasons several months ago. Not interested in resuming. No other known history of cardiovascular disease. No history of pulmonary or renal disease. Is not diabetic.       Review of Systems   The 10 point Review of Systems is negative other than noted in the HPI or here.     Past Medical History    I have reviewed this patient's medical history and updated it with pertinent information if needed.   Past Medical History:   Diagnosis Date     Hypercholesteremia      Substance abuse (H)        Past Surgical History   I have reviewed this patient's surgical history and updated it with pertinent information if needed.  History reviewed. No pertinent surgical  history.    Social History   I have reviewed this patient's social history and updated it with pertinent information if needed.  Social History     Tobacco Use     Smoking status: Never Smoker     Smokeless tobacco: Never Used   Substance Use Topics     Alcohol use: Yes     Comment: 500 ml per day vodka     Drug use: No       Family History   I have reviewed this patient's family history and updated it with pertinent information if needed.   No family history on file.    Medications   Current Facility-Administered Medications   Medication     acetaminophen (TYLENOL) tablet 650 mg     alum & mag hydroxide-simethicone (MYLANTA ES/MAALOX  ES) suspension 30 mL     atenolol (TENORMIN) tablet 50 mg     bisacodyl (DULCOLAX) Suppository 10 mg     cloNIDine (CATAPRES) tablet 0.1 mg     diazepam (VALIUM) tablet 5-20 mg     diphenhydrAMINE (BENADRYL) capsule 50 mg     folic acid (FOLVITE) tablet 1 mg     hydrOXYzine (ATARAX) tablet 25 mg     loperamide (IMODIUM) capsule 2 mg     magnesium hydroxide (MILK OF MAGNESIA) suspension 30 mL     multivitamin w/minerals (THERA-VIT-M) tablet 1 tablet     vitamin B1 (THIAMINE) tablet 100 mg       Allergies   Allergies   Allergen Reactions     Green Tea Roots Ext Other (See Comments) and Anaphylaxis     Says only happens with the brand Stash, throat swelling       Physical Exam   Vital Signs: Temp: 96.9  F (36.1  C) Temp src: Oral BP: (!) 160/114 Pulse: 74   Resp: 16 SpO2: 100 % O2 Device: None (Room air)    Weight: 194 lbs 3.2 oz    Constitutional: Awake and alert, in no apparent distress.   Eyes: Sclera clear, anicteric   ENT: Mucous membranes moist. PERRL  Respiratory: Breathing comfortably on room air. CTA bilaterally with no crackles, wheezing, or rhonchi. Good air entry throughout.   Cardiovascular:  RRR, normal S1/S2. No rubs or murmurs. Intact bilateral pedal pulses. No peripheral edema.   GI: Soft, non-tender, non-distended. Bowel sounds present.   Skin:  Good color. No jaundice.     Neurologic: Alert and oriented to person, place, and time. No focal deficits. Moves all extremities. No tremor.       Data   ROUTINE IP LABS (Last four results)  Recent Labs   Lab 09/07/19  1339      POTASSIUM 4.1   CHLORIDE 100   CO2 30   ANIONGAP 8   GLC 94   BUN 10   CR 0.74   TAINA 9.6     Recent Labs   Lab 09/07/19  1339   WBC 3.9*   RBC 4.66   HGB 16.2   HCT 45.8   MCV 98   MCH 34.8*   MCHC 35.4   RDW 11.7        No lab results found in last 7 days.     Glucose Values Latest Ref Rng & Units 9/7/2019   Bedside Glucose (mg/dl )  - --   GLUCOSE 70 - 99 mg/dL 94   Some recent data might be hidden

## 2019-09-08 NOTE — PLAN OF CARE
MSSA 8 at 1600. Valium 5 mg was given. His BP's are elevated but pulse was ok. We discussed medicine for BP and he is on clonidine. This RN has encouraged pt to discuss his BP concerns with his Dr on Monday. Pt agreed.

## 2019-09-09 ENCOUNTER — RECORDS - HEALTHEAST (OUTPATIENT)
Dept: ADMINISTRATIVE | Facility: OTHER | Age: 44
End: 2019-09-09

## 2019-09-09 VITALS
OXYGEN SATURATION: 98 % | SYSTOLIC BLOOD PRESSURE: 138 MMHG | HEART RATE: 71 BPM | TEMPERATURE: 99.1 F | RESPIRATION RATE: 16 BRPM | BODY MASS INDEX: 27.8 KG/M2 | WEIGHT: 194.2 LBS | DIASTOLIC BLOOD PRESSURE: 98 MMHG | HEIGHT: 70 IN

## 2019-09-09 LAB
ALBUMIN SERPL-MCNC: 3.8 G/DL (ref 3.4–5)
ALP SERPL-CCNC: 69 U/L (ref 40–150)
ALT SERPL W P-5'-P-CCNC: 265 U/L (ref 0–70)
AST SERPL W P-5'-P-CCNC: 100 U/L (ref 0–45)
BILIRUB DIRECT SERPL-MCNC: 0.5 MG/DL (ref 0–0.2)
BILIRUB SERPL-MCNC: 2.3 MG/DL (ref 0.2–1.3)
HAV IGM SERPL QL IA: NONREACTIVE
HBV CORE AB SERPL QL IA: NONREACTIVE
HBV SURFACE AB SERPL IA-ACNC: 136.58 M[IU]/ML
HBV SURFACE AG SERPL QL IA: NONREACTIVE
HCV AB SERPL QL IA: NONREACTIVE
PROT SERPL-MCNC: 7.2 G/DL (ref 6.8–8.8)

## 2019-09-09 PROCEDURE — 80076 HEPATIC FUNCTION PANEL: CPT | Performed by: PHYSICIAN ASSISTANT

## 2019-09-09 PROCEDURE — 36415 COLL VENOUS BLD VENIPUNCTURE: CPT | Performed by: PHYSICIAN ASSISTANT

## 2019-09-09 PROCEDURE — 25000132 ZZH RX MED GY IP 250 OP 250 PS 637: Performed by: PHYSICIAN ASSISTANT

## 2019-09-09 PROCEDURE — 25000132 ZZH RX MED GY IP 250 OP 250 PS 637: Performed by: PSYCHIATRY & NEUROLOGY

## 2019-09-09 PROCEDURE — 99238 HOSP IP/OBS DSCHRG MGMT 30/<: CPT | Performed by: PSYCHIATRY & NEUROLOGY

## 2019-09-09 RX ORDER — ESCITALOPRAM OXALATE 5 MG/1
5 TABLET ORAL DAILY
Qty: 30 TABLET | Refills: 1 | Status: SHIPPED | OUTPATIENT
Start: 2019-09-09

## 2019-09-09 RX ORDER — ESCITALOPRAM OXALATE 5 MG/1
5 TABLET ORAL DAILY
Status: DISCONTINUED | OUTPATIENT
Start: 2019-09-09 | End: 2019-09-09 | Stop reason: HOSPADM

## 2019-09-09 RX ORDER — MULTIPLE VITAMINS W/ MINERALS TAB 9MG-400MCG
1 TAB ORAL DAILY
Qty: 30 TABLET | Refills: 1 | Status: SHIPPED | OUTPATIENT
Start: 2019-09-10

## 2019-09-09 RX ORDER — AMLODIPINE BESYLATE 2.5 MG/1
2.5 TABLET ORAL DAILY
Status: DISCONTINUED | OUTPATIENT
Start: 2019-09-09 | End: 2019-09-09 | Stop reason: HOSPADM

## 2019-09-09 RX ORDER — FOLIC ACID 1 MG/1
1 TABLET ORAL DAILY
Qty: 30 TABLET | Refills: 1 | Status: SHIPPED | OUTPATIENT
Start: 2019-09-10

## 2019-09-09 RX ORDER — AMLODIPINE BESYLATE 2.5 MG/1
2.5 TABLET ORAL DAILY
Qty: 30 TABLET | Refills: 1 | Status: SHIPPED | OUTPATIENT
Start: 2019-09-10

## 2019-09-09 RX ORDER — CLONIDINE HYDROCHLORIDE 0.1 MG/1
0.1 TABLET ORAL 2 TIMES DAILY PRN
Qty: 60 TABLET | Refills: 1 | Status: SHIPPED | OUTPATIENT
Start: 2019-09-09

## 2019-09-09 RX ADMIN — CLONIDINE HYDROCHLORIDE 0.1 MG: 0.1 TABLET ORAL at 08:41

## 2019-09-09 RX ADMIN — AMLODIPINE BESYLATE 2.5 MG: 2.5 TABLET ORAL at 11:05

## 2019-09-09 RX ADMIN — ESCITALOPRAM 5 MG: 5 TABLET, FILM COATED ORAL at 11:49

## 2019-09-09 RX ADMIN — HYDROXYZINE HYDROCHLORIDE 25 MG: 25 TABLET, FILM COATED ORAL at 00:20

## 2019-09-09 RX ADMIN — FOLIC ACID 1 MG: 1 TABLET ORAL at 08:18

## 2019-09-09 RX ADMIN — MULTIPLE VITAMINS W/ MINERALS TAB 1 TABLET: TAB at 08:18

## 2019-09-09 RX ADMIN — Medication 100 MG: at 08:18

## 2019-09-09 NOTE — PROGRESS NOTES
Behavioral Team Discussion: (9/9/2019)    Continued Stay Criteria/Rationale: Patient admitted for alcohol withdrawal and Use Disorder.  Plan: The following services will be provided to the patient; psychiatric assessment, medication management, therapeutic milieu, individual and group support, and skills groups.   Participants: 3A Provider: Dr. Kenan Kam MD; 3A RN's: Eduardo Villafana RN; 3A CM's: Viji Toussaint .  Summary/Recommendation: Providers will assess today for treatment recommendations, discharge planning, and aftercare plans. CM will meet with pt for discharge planning.   Medical/Physical: None noted  Precautions:   Behavioral Orders   Procedures     Code 1 - Restrict to Unit     Routine Programming     As clinically indicated     Status 15     Every 15 minutes.     Withdrawal precautions     Rationale for change in precautions or plan: N/A  Progress: No Change.

## 2019-09-09 NOTE — DISCHARGE INSTRUCTIONS
Behavioral Bal Discharge Planning and Instructions  THANK YOU FOR CHOOSING THE [facility]  [facility]: [phone]    Summary: You were admitted to and processed through [facility] on September 7, 2019 for detoxification from alcohol.  A medical examination was performed that included lab work. You have met with a  and opted to pursue OP treatment from home.  Please make your recovery a priority, Mr. Gleason.     Main Diagnosis:  Alcohol Withdrawal: COMPLICATED    Major Treatments, Procedures and Findings:  You were detoxified from alcohol using the appropriate protocol. You have not completed a chemical dependency assessment.  You have been provided with a list of program options available to you.  You have had blood drawn, and the results have been reviewed with you.  Please take a copy of your laboratory work with you to your next provider appointment.    Symptoms to Report:  If you experience more anxiety, confusion, sleeplessness, deep sadness or thoughts of suicide, notify your treatment team or notify your primary care physician. IF THE SYMPTOMS YOU ARE EXPERIENCING ARE A MEDICAL EMERGENCY, CALL 911 IMMEDIATELY.     Lifestyle Adjustment: Adjust your lifestyle to get enough sleep, relaxation, exercise and excellent nutrition. Continue to develop healthy coping skills to decrease stress and promote a sober living environment. Do not use alcohol, illegal drugs or addictive medications other than what is currently prescribed. AA, NA, and a sponsor are excellent resources for support.     Primary Provider:  Dr. Wilberto Brooke on [address] [facility] [address] in [place] on Tuesday, September 24, 2019 11 AM  # [phone]    Resources:  [facility] [phone] Support Group:  AA/NA and Sponsor/support.  Crisis Intervention: 356.748.3697 or 964-768-2747 (TTY: 389.803.9682). Call anytime for help.  National [place] on Mental Illness (www.mn.bam.org): 897.668.9923 or  470.875.1953.  Alcoholics Anonymous (www.alcoholics-anonymous.org): Check your phone book for your local chapter.  Suicide Awareness Voices of Education (www.save.org): 991-234-CEVT (8434)  National Suicide Prevention Line (www.mentalhealthmn.org): 297-605-QDUE (7925)  Mental Health Consumer/Survivor Network of [place] (www.mhcsn.net): 194.563.5984 or 788-692-1267.  Mental Health Association of [place] (www.mentalhealth.org): 403.246.2026 or 263-827-5813  Substance Abuse and Mental Health Services. (www.samhsa.gov)    [place] [facility] ([facility])  [facility] connects people seeking recovery to resources that help foster and sustain long-term recovery.  Whether you are seeking resources for treatment, transportation, housing, job training, education, health care or other pathways to recovery, [facility] is a great place to start. 310.968.1364 www.[facility].org    General Medication Instruction: See your medication papers for instructions. Take all medicines as directed.  Make no changes unless your doctor suggests them. Go to all your doctor visits.  Be sure to have all your required lab tests. This way, your medicines may be refilled on time. Do not use any drugs not prescribed by your provider. AA/NA and sponsors are excellent resources for support. Avoid alcohol at all costs!    Please Note:  If you have any questions at anytime after you are discharged please call the [facility], [place] detoxification [place] [address] at [phone].  [facility], Behavioral Intake [phone]. Please take this discharge folder with you to all your follow up appointments, it contains your lab results, diagnosis, medication list and discharge recommendations.    THANK YOU FOR CHOOSING THE [facility]

## 2019-09-09 NOTE — PLAN OF CARE
Pt given copy of their discharge instructions and medication administration instructions. All discharge plans and labs were discussed with patient. Pt reports no questions at this time regarding discharge plans, labs or medications. Pt denies any suicidal ideation, plans or intent at this time. Patient is discharged at this time.

## 2019-09-09 NOTE — PROGRESS NOTES
Met with Pt to initiate discharge planning.  Informed Pt that [facility] does not have a CD specific program.  Pt requested information for more options near his home.  Provided Pt with contact information for several programs near his home and encouraged him to call to learn more.  Pt plans to explore his options from home with the help of his wife.

## 2019-09-09 NOTE — PROGRESS NOTES
Brief Medicine Note:    IM peripherally following lab work and BP. LFTs improving overall with  (3990,  (189), Tbili 2.3 (1.3), and ALP normal. Elevation in Tbili likely lag and 2/2 substance abuse although not in classic ratio. Hepatitis labs ordered not ordered per medicine, thus will defer follow up of results to their team. Please contact medicine if hepatitis labs abnormal and assistance with interpretation is required.   - Repeat LFTs 9/10 to monitor Tbili peak    BP is improved from admission but remains elevated in the setting of etoh withdrawal, ranging 140s-150s/90s-110s. No PTA meds. Started on bid clonidine this admission. Clonidine moved to prn this am to better assess BP curve. Continues to require valium for ongoing withdrawal.  - Will start Amlodipine 2.5 mg daily, continue prn clonidine and continue to watch BP curve    Contact medicine with ongoing questions    Hanna Alcocer PA-C  Internal Medicine [facility]  [phone]

## 2019-09-09 NOTE — DISCHARGE SUMMARY
Psychiatric Discharge Summary    Foreign Gleason MRN# [id number]   Age: 44 year old YOB: 1975     Date of Admission:  9/7/2019  Date of Discharge:  9/9/2019  4:39 PM  Admitting Physician:  Walter Queen MD  Discharge Physician:  Kenan Kam MD          Event Leading to Hospitalization:   History of present illness: The patient is a history of alcohol use disorder who presented voluntarily to the emergency room seeking detox from alcohol.  He presented with an alcohol breath test of 0.0694 reporting drinking 500 mL of vodka per day over the past 1 year.  He attributes his ongoing usage to suspected depressed mood stemming from an accumulation of various psychosocial stressors.  No recent acute stressors reported as being related to his current presentation.  Progressive usage of alcohol is reported to be related to tolerance.  He denied any other illicit substance usage.  His treatment goals are to safely detox from alcohol and transition to an outpatient MI CD treatment program.  Mood is depressed, characterized as mild,     Psychiatric Review of Systems:    -- Depressive episode: Mostly in regards to the regret and remorse regarding ongoing alcohol usage and inability to stop, low energy, fair appetite, poor sleep, denial of anhedonia, denial of suicidal and homicidal thoughts.  He denied feeling helpless or hopeless.  -- Urvashi:  denies symptoms  -- Psychosis:  denies symptoms  -- Anxiety: denies symptoms corresponding to YOHAN or panic disorder  -- PTSD: denies symptoms  -- OCD: denies  symptoms  -- Eating disorder: denies symptoms       See Admission note for additional details.          Diagnoses:     Alcohol use disorder, severe (dependence and withdrawal)  Unspecified depressive disorder (determine if substance-induced or related to a primary underlying mood disorder)              Labs:        Lab Results   Component Value Date     09/07/2019    Lab Results   Component Value Date     CHLORIDE 100 09/07/2019    Lab Results   Component Value Date    BUN 10 09/07/2019      Lab Results   Component Value Date    POTASSIUM 4.1 09/07/2019    Lab Results   Component Value Date    CO2 30 09/07/2019    Lab Results   Component Value Date    CR 0.74 09/07/2019          Lab Results   Component Value Date    WBC 3.9 (L) 09/07/2019    HGB 16.2 09/07/2019    HCT 45.8 09/07/2019    MCV 98 09/07/2019     09/07/2019     Lab Results   Component Value Date     (H) 09/09/2019     (H) 09/09/2019     (H) 09/07/2019    ALKPHOS 69 09/09/2019    BILITOTAL 2.3 (H) 09/09/2019     Lab Results   Component Value Date    TSH 1.41 09/07/2019            Consults:   Consultation during this admission received from internal medicine:    Assessment & Plan     Foreign Gleason is a 44 year old male with a history of HLD and alcohol abuse, who was admitted to station [facility] on 9/7/19 seeking detoxification from alcohol.      Alcohol abuse with acute withdrawal: Drinking 05 liter of vodka per day. Last drink 0100 on 9/7. Utox in the ED + for benzodiazepines, KEYANA 0.069.  No history of withdrawal seizures and DTs. MSSA currently 8.    - Continue on [facility] protocol with benzodiazepines as indicated   - Management per Psychiatry   - Agree with MVI, folic acid, and thiamine supplements    Elevated blood pressure: BP persistently elevated since admission with max SBP of 184. No prior diagnosis of hypertension, BP typically 135-140 at home. Most likely due to acute alcohol withdrawal syndrome vs underlying essential hypertension.    - Continue clonidine 0.1 mg BID through today, then transition to PRN tomorrow   - If BP is consistently >140/90 once he has completed withdrawal then will need to consider starting a daily antihypertensive medication     Transaminitis: ,  with direct bili 0.4, Tbili and alk phos wnl. LFTs last checked in 2017 and were wnl. No hepatitis risk factors, does not use tylenol regularly  and not on any hepatotoxic medications PTA. Most likely due to alcohol abuse though not elevated in typical 2:1 pattern.    - Recheck hepatic panel tomorrow   - Hepatitis screening pending     Hx of dyslipidemia: Maintained on atorvastatin 10 mg daily in the past, discontinued several months ago.          Hospital Course:   Foreign Gleason was admitted to [facility] with attending Kenan Kam MD as a voluntary patient. The patient was placed under status 15 (15 minute checks) to ensure patient safety.   MSSA protocol was initiated due to the patient's history of alcohol abuse and concern for withdrawal symptoms.  CBC, BMP and utox obtained.    The patient had not been taking any medications as an outpatient. Naltrexone was started after detox. Lexapro 5mg was started at discharge.     Foreign Gleason did participate in groups and was visible in the milieu.     The patient's symptoms of withdrawal improved.     Foreign Gleason was released to home. At the time of discharge Foreign Gleason was determined to not be a danger to himself or others. At the current time of discharge, the patient does not meet criteria for involuntary hospitalization. On the day of discharge, the patient reports that they do not have suicidal or homicidal ideation and would never hurt themselves or others. Steps taken to minimize risk include: assessing patient s behavior and thought process daily during hospital stay, discharging patient with adequate plan for follow up for mental and physical health and discussing safety plan of returning to the hospital should the patient ever have thoughts of harming themselves or others. Therefore, based on all available evidence including the factors cited above, the patient does not appear to be at imminent risk for self-harm, and is appropriate for outpatient level of care.           Discharge Medications:     Current Discharge Medication List      START taking these medications    Details    amLODIPine (NORVASC) 2.5 MG tablet Take 1 tablet (2.5 mg) by mouth daily  Qty: 30 tablet, Refills: 1    Associated Diagnoses: Benign essential hypertension      cloNIDine (CATAPRES) 0.1 MG tablet Take 1 tablet (0.1 mg) by mouth 2 times daily as needed (SBP >160 or DBP >110)  Qty: 60 tablet, Refills: 1    Associated Diagnoses: Benign essential hypertension      escitalopram (LEXAPRO) 5 MG tablet Take 1 tablet (5 mg) by mouth daily  Qty: 30 tablet, Refills: 1    Associated Diagnoses: Anxiety disorder, unspecified type      folic acid (FOLVITE) 1 MG tablet Take 1 tablet (1 mg) by mouth daily  Qty: 30 tablet, Refills: 1    Associated Diagnoses: Alcohol dependence with unspecified alcohol-induced disorder (H)      multivitamin w/minerals (THERA-VIT-M) tablet Take 1 tablet by mouth daily  Qty: 30 tablet, Refills: 1    Associated Diagnoses: Alcohol dependence with unspecified alcohol-induced disorder (H)         CONTINUE these medications which have NOT CHANGED    Details   diphenhydrAMINE (BENADRYL) 25 MG tablet Take 50 mg by mouth nightly as needed for sleep                  Psychiatric Examination:   Appearance:  awake, alert and adequately groomed  Attitude:  cooperative  Eye Contact:  good  Mood:  better  Affect:  mood congruent  Speech:  clear, coherent  Psychomotor Behavior:  no evidence of tardive dyskinesia, dystonia, or tics  Thought Process:  goal oriented  Associations:  no loose associations  Thought Content:  no evidence of suicidal ideation or homicidal ideation and no evidence of psychotic thought  Insight:  fair  Judgment:  intact  Oriented to:  time, person, and place  Attention Span and Concentration:  intact  Recent and Remote Memory:  fair  Language: Able to read and write  Fund of Knowledge: appropriate  Muscle Strength and Tone: normal  Gait and Station: Normal         Discharge Plan:   Continue medications as above.     Major Treatments, Procedures and Findings:  You were detoxified from alcohol  using the appropriate protocol. You have not completed a chemical dependency assessment.  You have been provided with a list of program options available to you.  You have had blood drawn, and the results have been reviewed with you.  Please take a copy of your laboratory work with you to your next provider appointment.     Symptoms to Report:  If you experience more anxiety, confusion, sleeplessness, deep sadness or thoughts of suicide, notify your treatment team or notify your primary care physician. IF THE SYMPTOMS YOU ARE EXPERIENCING ARE A MEDICAL EMERGENCY, CALL 911 IMMEDIATELY.      Lifestyle Adjustment: Adjust your lifestyle to get enough sleep, relaxation, exercise and excellent nutrition. Continue to develop healthy coping skills to decrease stress and promote a sober living environment. Do not use alcohol, illegal drugs or addictive medications other than what is currently prescribed. AA, NA, and a sponsor are excellent resources for support.      Primary Provider:  Dr. Wilberto Brooke on [address]  [address] in [place] on Tuesday, September 24, 2019 11 AM  # [phone]     Resources:  [facility] [phone] Support Group:  AA/NA and Sponsor/support.  Crisis Intervention: 667.519.3542 or 691-645-4262 (TTY: 904.716.6137). Call anytime for help.  National Riley on Mental Illness (www.mn.bam.org): 912.692.7995 or 291-275-0196.  Alcoholics Anonymous (www.alcoholics-anonymous.org): Check your phone book for your local chapter.  Suicide Awareness Voices of Education (www.save.org): 722-199-DSOE (9447)  National Suicide Prevention Line (www.mentalhealthmn.org): 319-707-LQWL (4375)  Mental Health Consumer/Survivor Network of [place] (www.mhcsn.net): 317.458.4088 or 969-870-0616.  Mental Health Association of [place] (www.mentalhealth.org): 532.735.1011 or 345-170-4357  Substance Abuse and Mental Health Services. (www.samhsa.gov)     [place] Recovery [facility] (MRC)  [facility] connects people seeking  recovery to resources that help foster and sustain long-term recovery.  Whether you are seeking resources for treatment, transportation, housing, job training, education, health care or other pathways to recovery, [facility] is a great place to start. 600.786.3344 www.[facility].org     General Medication Instruction: See your medication papers for instructions. Take all medicines as directed.  Make no changes unless your doctor suggests them. Go to all your doctor visits.  Be sure to have all your required lab tests. This way, your medicines may be refilled on time. Do not use any drugs not prescribed by your provider. AA/NA and sponsors are excellent resources for support. Avoid alcohol at all costs!     Please Note:  If you have any questions at anytime after you are discharged please call the [facility], [place] detoxification [address] at [phone].  [facility], Behavioral Intake [phone]. Please take this discharge folder with you to all your follow up appointments, it contains your lab results, diagnosis, medication list and discharge recommendations.    THANK YOU FOR CHOOSING THE [facility]    Attestation:  The patient was seen and evaluated by me. I spent less than 30 minutes on discharge day activities. Kenan Kam MD

## 2019-09-24 ENCOUNTER — OFFICE VISIT - HEALTHEAST (OUTPATIENT)
Dept: INTERNAL MEDICINE | Facility: CLINIC | Age: 44
End: 2019-09-24

## 2019-09-24 DIAGNOSIS — F10.10 ALCOHOL ABUSE: ICD-10-CM

## 2019-09-24 DIAGNOSIS — F34.1 DYSTHYMIA: ICD-10-CM

## 2019-09-24 DIAGNOSIS — I10 ESSENTIAL HYPERTENSION: ICD-10-CM

## 2019-10-17 ENCOUNTER — OFFICE VISIT - HEALTHEAST (OUTPATIENT)
Dept: INTERNAL MEDICINE | Facility: CLINIC | Age: 44
End: 2019-10-17

## 2019-10-17 DIAGNOSIS — I10 ESSENTIAL HYPERTENSION: ICD-10-CM

## 2019-10-17 DIAGNOSIS — F10.10 ALCOHOL ABUSE: ICD-10-CM

## 2019-10-17 ASSESSMENT — MIFFLIN-ST. JEOR: SCORE: 1728.14

## 2019-10-17 ASSESSMENT — PATIENT HEALTH QUESTIONNAIRE - PHQ9: SUM OF ALL RESPONSES TO PHQ QUESTIONS 1-9: 11

## 2019-11-05 ENCOUNTER — COMMUNICATION - HEALTHEAST (OUTPATIENT)
Dept: INTERNAL MEDICINE | Facility: CLINIC | Age: 44
End: 2019-11-05

## 2019-11-05 ENCOUNTER — TRANSFERRED RECORDS (OUTPATIENT)
Dept: HEALTH INFORMATION MANAGEMENT | Facility: CLINIC | Age: 44
End: 2019-11-05

## 2020-01-06 ENCOUNTER — OFFICE VISIT - HEALTHEAST (OUTPATIENT)
Dept: INTERNAL MEDICINE | Facility: CLINIC | Age: 45
End: 2020-01-06

## 2020-01-06 DIAGNOSIS — F34.1 DYSTHYMIA: ICD-10-CM

## 2020-01-06 DIAGNOSIS — Z23 NEED FOR PROPHYLACTIC VACCINATION AND INOCULATION AGAINST INFLUENZA: ICD-10-CM

## 2020-01-06 DIAGNOSIS — Z91.89 AT RISK FOR POLYPHARMACY: ICD-10-CM

## 2020-01-06 DIAGNOSIS — Z13.220 LIPID SCREENING: ICD-10-CM

## 2020-01-06 DIAGNOSIS — E66.3 OVERWEIGHT (BMI 25.0-29.9): ICD-10-CM

## 2020-01-06 DIAGNOSIS — I10 ESSENTIAL HYPERTENSION: ICD-10-CM

## 2020-06-12 ENCOUNTER — COMMUNICATION - HEALTHEAST (OUTPATIENT)
Dept: INTERNAL MEDICINE | Facility: CLINIC | Age: 45
End: 2020-06-12

## 2020-06-22 ENCOUNTER — OFFICE VISIT - HEALTHEAST (OUTPATIENT)
Dept: INTERNAL MEDICINE | Facility: CLINIC | Age: 45
End: 2020-06-22

## 2020-06-22 DIAGNOSIS — N52.9 ERECTILE DYSFUNCTION: ICD-10-CM

## 2020-06-22 DIAGNOSIS — F32.0 CURRENT MILD EPISODE OF MAJOR DEPRESSIVE DISORDER, UNSPECIFIED WHETHER RECURRENT (H): ICD-10-CM

## 2020-06-22 DIAGNOSIS — I10 ESSENTIAL HYPERTENSION: ICD-10-CM

## 2020-06-22 ASSESSMENT — PATIENT HEALTH QUESTIONNAIRE - PHQ9: SUM OF ALL RESPONSES TO PHQ QUESTIONS 1-9: 12

## 2020-07-20 ENCOUNTER — COMMUNICATION - HEALTHEAST (OUTPATIENT)
Dept: INTERNAL MEDICINE | Facility: CLINIC | Age: 45
End: 2020-07-20

## 2020-07-20 DIAGNOSIS — N52.9 ERECTILE DYSFUNCTION: ICD-10-CM

## 2020-07-27 ENCOUNTER — COMMUNICATION - HEALTHEAST (OUTPATIENT)
Dept: INTERNAL MEDICINE | Facility: CLINIC | Age: 45
End: 2020-07-27

## 2020-07-27 DIAGNOSIS — I10 ESSENTIAL HYPERTENSION: ICD-10-CM

## 2020-08-20 ENCOUNTER — COMMUNICATION - HEALTHEAST (OUTPATIENT)
Dept: INTERNAL MEDICINE | Facility: CLINIC | Age: 45
End: 2020-08-20

## 2020-08-20 DIAGNOSIS — N52.9 ERECTILE DYSFUNCTION: ICD-10-CM

## 2020-10-20 ENCOUNTER — COMMUNICATION - HEALTHEAST (OUTPATIENT)
Dept: INTERNAL MEDICINE | Facility: CLINIC | Age: 45
End: 2020-10-20

## 2020-10-20 DIAGNOSIS — I10 ESSENTIAL HYPERTENSION: ICD-10-CM

## 2021-03-17 ENCOUNTER — COMMUNICATION - HEALTHEAST (OUTPATIENT)
Dept: INTERNAL MEDICINE | Facility: CLINIC | Age: 46
End: 2021-03-17

## 2021-03-17 DIAGNOSIS — I10 ESSENTIAL HYPERTENSION: ICD-10-CM

## 2021-05-26 ASSESSMENT — PATIENT HEALTH QUESTIONNAIRE - PHQ9: SUM OF ALL RESPONSES TO PHQ QUESTIONS 1-9: 11

## 2021-05-27 ASSESSMENT — PATIENT HEALTH QUESTIONNAIRE - PHQ9: SUM OF ALL RESPONSES TO PHQ QUESTIONS 1-9: 12

## 2021-05-30 VITALS — WEIGHT: 179 LBS | HEIGHT: 69 IN | BODY MASS INDEX: 26.51 KG/M2

## 2021-05-30 VITALS — HEIGHT: 69 IN | WEIGHT: 181 LBS | BODY MASS INDEX: 26.81 KG/M2

## 2021-05-31 VITALS — WEIGHT: 188 LBS | HEIGHT: 69 IN | BODY MASS INDEX: 27.85 KG/M2

## 2021-06-01 NOTE — PROGRESS NOTES
Office Visit - Follow up    Foreign Gleason   44 y.o. male    Date of Visit: 9/24/2019    Chief Complaint   Patient presents with     Hypertension     Medication Questions       Subjective: Pleasant patient I have not seen for the past 2 years with history of alcohol abuse disorder.  Was sober for approximately 18 months however relapsed approximately 8 months ago.  Was drinking heavily for 8 months and ultimately was admitted to detox and has been undergoing outpatient treatment.  He is extremely motivated and has an excellent attitude for recovery.    Was treated with a combination of clonidine and amlodipine for hypertension blood pressures have been well controlled    Was treated with trazodone for insomnia he has not been using this and has been using OTC sleeping medications containing diphenhydramine I have asked him to use this as needed    Is on atorvastatin for lipid lowering and has been stable also on multiple vitamins including folic acid    Was seen by a psychiatrist in treatment and started on low-dose Lexapro follow-up with psychiatrist as planned    We had a extremely candid discussion about recovery and I have suggested support group and AA meetings obtaining sponsor etc. suggested specific sites for meetings etc.  Generally he is quite motivated and generally wants to recover.  Discussed psychosocial interactions particularly with his wife and friends.  Is employed as an  has no employment or major legal issues    PHQ 9 score is 7 primarily concerns regarding self-worth he denies any suicidal ideation    ROS: A comprehensive review of systems was performed and was otherwise negative except as mentioned above.     Exam  Brief exam unremarkable no major findings of withdrawal he is not tremulous cognitive function normal heart lungs and abdomen unremarkable   /82 (Patient Site: Left Arm, Patient Position: Sitting, Cuff Size: Adult Regular)   Pulse 80   Wt 184 lb (83.5 kg)   SpO2  98%   BMI 27.17 kg/m      Assessment and Plan  Substance abuse disorder with ethanol currently in remission continued support group therapy sponsorship etc. is strongly encouraged candidly    Hypertension stable likely will be able to wean from meds clonidine will continue to be helpful for any withdrawal symptoms    Moderate dysthymia on extremely low-dose Cipro he will discuss this further with upcoming visit to his psychiatrist.  Follow-up every 4 weeks in early recovery    Foreign was seen today for hypertension and medication questions.    Diagnoses and all orders for this visit:    Alcohol abuse    Dysthymia    Essential hypertension          Time: total time spent with the patient was 30 minutes of which >50% was spent in counseling and coordination of care        No Known Allergies    Medications :  Prior to Admission medications    Medication Sig Start Date End Date Taking? Authorizing Provider   amLODIPine (NORVASC) 2.5 MG tablet Take 2.5 mg by mouth daily.        9/10/19  Yes PROVIDER, HISTORICAL   CERTAVITE-ANTIOXIDANT  mg-mcg Tab Take 1 tablet by mouth daily.        9/9/19  Yes PROVIDER, HISTORICAL   cloNIDine HCl (CATAPRES) 0.1 MG tablet Take 0.1 mg by mouth 2 (two) times a day. If systolic BP is >160 or diastolic >110       9/9/19  Yes PROVIDER, HISTORICAL   escitalopram oxalate (LEXAPRO) 5 MG tablet Take 5 mg by mouth daily.        9/9/19  Yes PROVIDER, HISTORICAL   folic acid (FOLVITE) 1 MG tablet Take 1 mg by mouth daily.        9/10/19  Yes PROVIDER, HISTORICAL   sildenafil, antihypertensive, (REVATIO) 20 mg tablet TAKE 1 TABLET (20 MG TOTAL) BY MOUTH AS NEEDED (FOR ED). 11/14/18  Yes Wilberto Brooke MD   traZODone (DESYREL) 50 MG tablet Take 2 tablets (100 mg total) by mouth at bedtime. 7/3/17 9/24/19 Yes Wilberto Brooke MD   atorvastatin (LIPITOR) 10 MG tablet Take 10 mg by mouth bedtime.    PROVIDER, HISTORICAL   gabapentin (NEURONTIN) 300 MG capsule Take 1 capsule (300 mg total) by  mouth 2 (two) times a day.  Patient taking differently: Take 300 mg by mouth daily.  2/21/17 9/24/19  Wilberto Brooke MD   naltrexone (DEPADE) 50 mg tablet Take 1 tablet (50 mg total) by mouth daily. 2/2/17 9/24/19  Wilberto Brooke MD        Past Medical History: No past medical history on file.    Past Surgical History: No past surgical history on file.    Social History:   Social History     Socioeconomic History     Marital status:      Spouse name: Not on file     Number of children: Not on file     Years of education: Not on file     Highest education level: Not on file   Occupational History     Not on file   Social Needs     Financial resource strain: Not on file     Food insecurity:     Worry: Not on file     Inability: Not on file     Transportation needs:     Medical: Not on file     Non-medical: Not on file   Tobacco Use     Smoking status: Never Smoker     Smokeless tobacco: Never Used   Substance and Sexual Activity     Alcohol use: Not on file     Drug use: Not on file     Sexual activity: Not on file   Lifestyle     Physical activity:     Days per week: Not on file     Minutes per session: Not on file     Stress: Not on file   Relationships     Social connections:     Talks on phone: Not on file     Gets together: Not on file     Attends Hoahaoism service: Not on file     Active member of club or organization: Not on file     Attends meetings of clubs or organizations: Not on file     Relationship status: Not on file     Intimate partner violence:     Fear of current or ex partner: Not on file     Emotionally abused: Not on file     Physically abused: Not on file     Forced sexual activity: Not on file   Other Topics Concern     Not on file   Social History Narrative     Not on file       Family History: No family history on file.      Wilberto Brooke MD

## 2021-06-02 NOTE — PROGRESS NOTES
"Office Visit - Follow up    Foreign Gleason   44 y.o. male    Date of Visit: 10/17/2019    Chief Complaint   Patient presents with     Follow-up     Check up       Subjective: Here for follow-up of substance abuse disorder and hypertension.  He is now sober for approximately 4 to 5 weeks.  He is involved in a recovery program and we had a long discussion about this.  He plans formal evaluation and specific treatment through [facility] next week.    Generally feels well has been on low-dose clonidine primarily for withdrawal symptoms and low-dose amlodipine.  Blood pressures however are elevated as noted today.  He is feeling well with minimal withdrawal symptoms    ROS: A comprehensive review of systems was performed and was otherwise negative except as mentioned above.     Exam  Blood pressures are reaffirmed.  Heart and lungs negative       BP (!) 140/102   Pulse 86   Ht 5' 9\" (1.753 m)   Wt 188 lb (85.3 kg)   SpO2 97%   BMI 27.76 kg/m      Assessment and Plan  Hypertension which we will treat with lisinopril hydrochlorothiazide.  Follow-up here in 3 weeks.    Mild dysthymic disorder versus significant depressive disorder he is on low-dose Lexapro and is followed by a psychiatrist I suggested possibility of adjusting dose or considering another agent as his depressive symptoms are unchanged    Substance abuse disorder stable currently in remission continue with active and ongoing treatment and support therapy    Foreign was seen today for follow-up.    Diagnoses and all orders for this visit:    Alcohol abuse    Essential hypertension  -     lisinopril-hydrochlorothiazide (PRINZIDE,ZESTORETIC) 10-12.5 mg per tablet; Take 1 tablet by mouth daily.          Time: total time spent with the patient was 25 minutes of which >50% was spent in counseling and coordination of care        No Known Allergies    Medications :  Prior to Admission medications    Medication Sig Start Date End Date Taking? Authorizing " Provider   amLODIPine (NORVASC) 2.5 MG tablet Take 2.5 mg by mouth daily.        9/10/19  Yes PROVIDER, HISTORICAL   CERTAVITE-ANTIOXIDANT  mg-mcg Tab Take 1 tablet by mouth daily.        9/9/19  Yes PROVIDER, HISTORICAL   cloNIDine HCl (CATAPRES) 0.1 MG tablet Take 0.1 mg by mouth 2 (two) times a day. If systolic BP is >160 or diastolic >110       9/9/19  Yes PROVIDER, HISTORICAL   escitalopram oxalate (LEXAPRO) 5 MG tablet Take 5 mg by mouth daily.        9/9/19  Yes PROVIDER, HISTORICAL   folic acid (FOLVITE) 1 MG tablet Take 1 mg by mouth daily.        9/10/19  Yes PROVIDER, HISTORICAL   sildenafil, antihypertensive, (REVATIO) 20 mg tablet TAKE 1 TABLET (20 MG TOTAL) BY MOUTH AS NEEDED (FOR ED). 11/14/18  Yes Wilberto Brooke MD   atorvastatin (LIPITOR) 10 MG tablet Take 10 mg by mouth bedtime.  10/17/19 Yes PROVIDER, HISTORICAL   lisinopril-hydrochlorothiazide (PRINZIDE,ZESTORETIC) 10-12.5 mg per tablet Take 1 tablet by mouth daily. 10/17/19   Wilberto Brooke MD        Past Medical History: No past medical history on file.    Past Surgical History: No past surgical history on file.    Social History:   Social History     Socioeconomic History     Marital status:      Spouse name: Not on file     Number of children: Not on file     Years of education: Not on file     Highest education level: Not on file   Occupational History     Not on file   Social Needs     Financial resource strain: Not on file     Food insecurity:     Worry: Not on file     Inability: Not on file     Transportation needs:     Medical: Not on file     Non-medical: Not on file   Tobacco Use     Smoking status: Never Smoker     Smokeless tobacco: Never Used   Substance and Sexual Activity     Alcohol use: Not on file     Drug use: Not on file     Sexual activity: Not on file   Lifestyle     Physical activity:     Days per week: Not on file     Minutes per session: Not on file     Stress: Not on file   Relationships     Social  connections:     Talks on phone: Not on file     Gets together: Not on file     Attends [employer] service: Not on file     Active member of club or organization: Not on file     Attends meetings of clubs or organizations: Not on file     Relationship status: Not on file     Intimate partner violence:     Fear of current or ex partner: Not on file     Emotionally abused: Not on file     Physically abused: Not on file     Forced sexual activity: Not on file   Other Topics Concern     Not on file   Social History Narrative     Not on file       Family History: No family history on file.      Wilberto Brooke MD

## 2021-06-03 VITALS
HEART RATE: 86 BPM | OXYGEN SATURATION: 97 % | DIASTOLIC BLOOD PRESSURE: 102 MMHG | HEIGHT: 69 IN | WEIGHT: 188 LBS | BODY MASS INDEX: 27.85 KG/M2 | SYSTOLIC BLOOD PRESSURE: 140 MMHG

## 2021-06-03 VITALS
DIASTOLIC BLOOD PRESSURE: 82 MMHG | HEART RATE: 80 BPM | BODY MASS INDEX: 26.4 KG/M2 | OXYGEN SATURATION: 98 % | SYSTOLIC BLOOD PRESSURE: 132 MMHG | WEIGHT: 184 LBS

## 2021-06-03 NOTE — TELEPHONE ENCOUNTER
Medication Question or Clarification  Who is calling: Patient  What medication are you calling about? (include dose and sig)   lisinopril-hydrochlorothiazide (PRINZIDE,ZESTORETIC) 10-12.5 mg per tablet 90 tablet 3 10/17/2019     Sig - Route: Take 1 tablet by mouth daily. - Oral    Sent to pharmacy as: lisinopril 10 mg-hydrochlorothiazide 12.5 mg tablet (PRINZIDE,ZESTORETIC)        Who prescribed the medication?:   Wilberto Brooke MD  What is your question/concern?:   Patient is questioning if he is to stay on the amlodipine and clonidine while taking the above medication.  He still has some left of the amlodipine and clonidine.  Please reach out to patient and advise.  Pharmacy:   [id number]  Okay to leave a detailed message?: Yes  Site CMT - Please call the pharmacy to obtain any additional needed information.

## 2021-06-03 NOTE — TELEPHONE ENCOUNTER
Left patient a detailed message that Dr. Brooke would like him to continue taking both medications while taking the lisinopril/HCTZ.

## 2021-06-04 VITALS
DIASTOLIC BLOOD PRESSURE: 64 MMHG | OXYGEN SATURATION: 99 % | WEIGHT: 186.08 LBS | BODY MASS INDEX: 27.48 KG/M2 | SYSTOLIC BLOOD PRESSURE: 118 MMHG | HEART RATE: 70 BPM

## 2021-06-04 NOTE — PATIENT INSTRUCTIONS - HE
Let us recheck your cholesterol in your next visit in 6 months. Let us please check it fasting. We will check your fasting lipid panel and electrolytes whilse on the lisinopril-hctz  I would like to see you in 6 months  Continue to focus on incorporating activity into your day to meet your goal of 160 lbs by this summer  The folic acid supplement is usually for macrocytic anemia in the setting of alcohol use. You levels are normal so you probably don't need it    DASH diet information is below:     Eating Heart-Healthy Foods  Eating has a big impact on your heart health. In fact, eating healthier can improve several of your heart risks at once. For instance, it helps you manage weight, cholesterol, and blood pressure. Here are ideas to help you make heart-healthy changes without giving up all the foods and flavors you love.  Getting started    Talk with your healthcare provider about eating plans, such as the DASH or Mediterranean diet. You may also be referred to a dietitian.    Change a few things at a time. Give yourself time to get used to a few eating changes before adding more.    Work to create a tasty, healthy eating plan that you can stick to for the rest of your life.      Goals for healthy eating  Below are some tips to improve your eating habits:    Limit saturated fats and trans fats. Saturated fats raise your levels of cholesterol, so keep these fats to a minimum. They are found in foods such as fatty meats, whole milk, cheese, and palm and coconut oils. Avoid trans fats because they lower good cholesterol as well as raise bad cholesterol. Trans fats are most often found in processed foods.    Reduce sodium (salt) intake. Eating too much salt may increase your blood pressure. Limit your sodium intake to 2,300 milligrams (mg) per day (the amount in 1 teaspoon of salt), or less if your healthcare provider recommends it. Dining out less often and eating fewer processed foods are two great ways to decrease  the amount of salt you consume.    Managing calories. A calorie is a unit of energy. Your body burns calories for fuel, but if you eat more calories than your body burns, the extras are stored as fat. Your healthcare provider can help you create a diet plan to manage your calories. This will likely include eating healthier foods as well as exercising regularly. To help you track your progress, keep a diary to record what you eat and how often you exercise.  Choose the right foods  Aim to make these foods staples of your diet. If you have diabetes, you may have different recommendations than what is listed here:    Fruits and vegetables provide plenty of nutrients without a lot of calories. At meals, fill half your plate with these foods. Split the other half of your plate between whole grains and lean protein.    Whole grains are high in fiber and rich in vitamins and nutrients. Good choices include whole-wheat bread, pasta, and brown rice.    Lean proteins give you nutrition with less fat. Good choices include fish, skinless chicken, and beans.    Low-fat or nonfat dairy provides nutrients without a lot of fat. Try low-fat or nonfat milk, cheese, or yogurt.    Healthy fats can be good for you in small amounts. These are unsaturated fats, such as olive oil, nuts, and fish. Try to have at least 2 servings per week of fatty fish, such as salmon, sardines, mackerel, rainbow trout, and albacore tuna. These contain omega-3 fatty acids, which are good for your heart. Flaxseed is another source of a heart-healthy fat.  More on heart-healthy eating  Read food labels  Healthy eating starts at the grocery store. Be sure to pay attention to food labels on packaged foods. Look for products that are high in fiber and protein, and low in saturated fat, cholesterol, and sodium. Avoid products that contain trans fat. And pay close attention to serving size. For instance, if you plan to eat two servings, double all the numbers on  the label.  Prepare food right  A key part of healthy cooking is cutting down on added fat and salt. Look on the internet for lower-fat, lower-sodium recipes. Also, try these tips:    Remove fat from meat and skin from poultry before cooking.    Skim fat from the surface of soups and sauces.    Broil, boil, bake, steam, grill, and microwave food without added fats.    Choose ingredients that spice up your food without adding calories, fat, or sodium. Try these items: horseradish, hot sauce, lemon, mustard, nonfat salad dressings, and vinegar. For salt-free herbs and spices, try basil, cilantro, cinnamon, pepper, and rosemary.  Date Last Reviewed: 10/1/2017    0152-2240 [employer]. [address] [place] [address]. All rights reserved. This information is not intended as a substitute for professional medical care. Always follow your healthcare professional's instructions.

## 2021-06-04 NOTE — PROGRESS NOTES
[facility] Clinic Note  Foreign Gleason   44 y.o. male    Date of Visit: 1/6/2020  Chief Complaint   Patient presents with     Establish Care     Hypertension     started lisinopril-HCTZ in October     Assessment/Plan  1. Need for prophylactic vaccination and inoculation against influenza  - Influenza, Seasonal Quad, PF =/> 6months    2. Essential hypertension  4. At risk for polypharmacy  Discontinued folic acid, which he was on presumably in the setting of his EtOH history. Removed amlodipine from his med list and likely will not need clonidine with continued weight loss and adherance to DASH diet. Provided him with instructions on nutriton changes and counseled him regarding incorporating more activity into his daily schedule. Also going through multiple stressors, which therapy might be beneficial/helpful  - Basic Metabolic Panel; Future    3. Lipid screening  5. Overweight (BMI 25.0-29.9)  Will check in next visit, which will likely be in 6 months if not sooner. Was not fasting today unfortunately. Hopefully with meaningful weight loss and increased activity and adhering to an improved diet, I suspect his FLP labs would be encouraging.   - Lipid Cascade; Future    6. Dysthymia  Sees a psychiatrist, who rx the fluxoetine and mirtazapine. Also take hydroxyzine for sleep. Trazodone has not helped/worked in the past. Might benefit from seeing a therapist/CBT to talk through what he is going through  Much or all of the text in this note was generated through the use of Dragon Dictate voice-to-text software. Errors in spelling or words which seem out of context are unintentional. Sound alike errors, in particular, may have escaped editing  Donovan Su MD    Return in about 6 months (around 7/6/2020), or if symptoms worsen or fail to improve.    Subjective  This 44 y.o. old male who prsents to talk about his     States he was having blurr yvision and becoming near sighted with  clonidine (amongst other medications). This started with initiating the blood pressure medications. Will take clonidine if his SBP is in the 150s-160s, or higher. Typically in the 140/90 at home.     His tried dietary changes and weight loss. Has lost 25# in the last 6 months. States his father has heart disease and brother is on a statin for high cholesterol. Still working on salt intake. Apologetic about eating red meat. Tries to walk, but harder in the winter. Enjoys biking versus swimming. Goal weight 160#. Endorses a fair amount of stress in his life. Has a support system, but is not sure if it is completely helpful. Has one child.   Also has sleep issues managed with mirtazapine and atarax. Also takes benadryl PRN if needed. Sees a psychiatry who Rx fluoxetine and mirtazapine. Thinks prozac has been helpful. Has tried melatonin. Stops caffeine in the late morning. For 2020, plan is to pursue incorporating activity into his day.    ROS A comprehensive review of systems was performed and was otherwise negative    Medications, allergies, and problem list were reviewed and updated    Exam  General appearance: Pleasant, nontoxic-appearing, no acute distress, alert and oriented x4  Vitals:    01/06/20 1000   BP: 118/64   Pulse: 70   SpO2: 99%     EYES: Eyelids, conjunctiva, and sclera were normal.  RESPIRATORY: Bilaterally with no crackles, wheezing or rhonchi  CARDIOVASCULAR: Regular S1 and S2.  Radial pulses intact.  No edema.  GASTROINTESTINAL: NABS, soft, nontender, nondistended, no hepatomegaly  MUSCULOSKELETAL: Skeletal configuration was normal and muscle mass was normal for age.     Additional Information   Current Outpatient Medications   Medication Sig Dispense Refill     CERTAVITE-ANTIOXIDANT  mg-mcg Tab Take 1 tablet by mouth daily.              cloNIDine HCl (CATAPRES) 0.1 MG tablet Take 0.1 mg by mouth 2 (two) times a day. If systolic BP is >160 or diastolic >110             FLUoxetine (PROZAC) 20  MG capsule Take 20 mg by mouth daily.       hydrOXYzine HCl (ATARAX) 25 MG tablet Take 25 mg by mouth at bedtime as needed.        lisinopril-hydrochlorothiazide (PRINZIDE,ZESTORETIC) 10-12.5 mg per tablet Take 1 tablet by mouth daily. 90 tablet 3     mirtazapine (REMERON) 15 MG tablet Take 15 mg by mouth at bedtime.       sildenafil, antihypertensive, (REVATIO) 20 mg tablet TAKE 1 TABLET (20 MG TOTAL) BY MOUTH AS NEEDED (FOR ED). 20 tablet 2     amLODIPine (NORVASC) 2.5 MG tablet Take 2.5 mg by mouth daily.              No current facility-administered medications for this visit.      No Known Allergies  Social History     Social History Narrative     Not on file     Social History     Tobacco Use     Smoking status: Never Smoker     Smokeless tobacco: Never Used   Substance Use Topics     Alcohol use: Not on file     Drug use: Not on file     Family History   Problem Relation Age of Onset     Heart disease Father         CABG X 4 in his 60s     Hypertension Brother      Time: total time spent with the patient was 25 minutes of which >50% was spent in counseling and coordination of care

## 2021-06-08 NOTE — PROGRESS NOTES
"Office Visit - Follow up    Foreign Gleason   41 y.o. male    Date of Visit: 1/20/2017    Chief Complaint   Patient presents with     Follow-up     1 month follow up       Subjective: Here for follow-up of substance abuse disorder with alcohol.  He is free of alcohol and sober for the past 50+ days    Continues on gabapentin and naltrexone and has recently been evaluated by Dr. Yang garcias these notes are reviewed    Gen. health has been good he has minimal if any withdrawal symptoms    We continued to have a candid discussion regarding recovery efforts and I have strongly advised involvement with Alcoholics Anonymous    No other major new concerns he has had no recent withdrawal symptoms anxiety symptoms are improving        ROS: A comprehensive review of systems was performed and was otherwise negative except as mentioned above.     Exam  Unremarkable and unchanged     Visit Vitals     /72     Pulse 78     Ht 5' 9\" (1.753 m)     Wt 181 lb (82.1 kg)     BMI 26.73 kg/m2       Assessment and Plan   Substance abuse disorder stable hopefully will be able to wean from his meds within the next 2-4 months I will continue to see him monthly no other changes    Foreign was seen today for follow-up.    Diagnoses and all orders for this visit:    Alcohol abuse          Time: total time spent with the patient was 15 minutes of which >50% was spent in counseling and coordination of care        No Known Allergies    Medications :  Prior to Admission medications    Medication Sig Start Date End Date Taking? Authorizing Provider   atorvastatin (LIPITOR) 10 MG tablet Take 10 mg by mouth bedtime.   Yes PROVIDER, HISTORICAL   gabapentin (NEURONTIN) 300 MG capsule TAKE 1 CAPSULE (300 MG TOTAL) BY MOUTH 3 (THREE) TIMES A DAY. 1/13/17  Yes Wilberto Brooke MD   naltrexone (DEPADE) 50 mg tablet Take 1 tablet (50 mg total) by mouth daily. 12/29/16  Yes Wilberto Brooke MD   traZODone (DESYREL) 50 MG tablet Take 2 tablets (100 mg " total) by mouth bedtime. 12/16/16  Yes Wilberto Brooke MD   traZODone (DESYREL) 100 MG tablet Take 1 tablet (100 mg total) by mouth bedtime. 12/29/16 1/20/17  Wilberto Brooke MD        Past Medical History: No past medical history on file.    Past Surgical History: No past surgical history on file.    Social History:   Social History     Social History     Marital status:      Spouse name: N/A     Number of children: N/A     Years of education: N/A     Occupational History     Not on file.     Social History Main Topics     Smoking status: Never Smoker     Smokeless tobacco: Not on file     Alcohol use Not on file     Drug use: Not on file     Sexual activity: Not on file     Other Topics Concern     Not on file     Social History Narrative       Family History: No family history on file.      Wilberto Brooke MD

## 2021-06-09 NOTE — PROGRESS NOTES
"Office Visit - Follow up    Foreign Gleason   41 y.o. male    Date of Visit: 2/21/2017    Chief Complaint   Patient presents with     Follow-up     Check up       Subjective: Here for follow-up of substance abuse disorder with alcohol    He has been free of alcohol and sober for approximately 85 days.  Minimal symptoms of withdrawal    Is followed by Dr. Yang Hernandez and continues on combination of gabapentin and naltrexone.  Started on trazodone for insomnia stable    Generally doing well he continues to work and is active in recovery support with AA.  No symptoms of withdrawal at present.    ROS: A comprehensive review of systems was performed and was otherwise negative except as mentioned above.     Exam  Brief exam unremarkable no change he is not tremulous no evidence of jaundice     Visit Vitals     /70     Pulse 80     Ht 5' 9\" (1.753 m)     Wt 179 lb (81.2 kg)     BMI 26.43 kg/m2       Assessment and Plan   Continues to do well with recovery I have had continued candid discussions with him about recovery no other changes Dr. Hernandez well prescribed naltrexone and I will manage his other meds plan follow-up in 2 [id number]    There are no diagnoses linked to this encounter.      Time: total time spent with the patient was 20 minutes of which >50% was spent in counseling and coordination of care        No Known Allergies    Medications :  Prior to Admission medications    Medication Sig Start Date End Date Taking? Authorizing Provider   atorvastatin (LIPITOR) 10 MG tablet Take 10 mg by mouth bedtime.   Yes PROVIDER, HISTORICAL   gabapentin (NEURONTIN) 300 MG capsule Take 1 capsule (300 mg total) by mouth 2 (two) times a day. 2/21/17  Yes Wilberto Brooke MD   naltrexone (DEPADE) 50 mg tablet Take 1 tablet (50 mg total) by mouth daily. 2/2/17  Yes Wilberto Brooke MD   traZODone (DESYREL) 50 MG tablet Take 2 tablets (100 mg total) by mouth bedtime. 2/21/17  Yes Wilberto Brooke MD        Past Medical History: No " past medical history on file.    Past Surgical History: No past surgical history on file.    Social History:   Social History     Social History     Marital status:      Spouse name: N/A     Number of children: N/A     Years of education: N/A     Occupational History     Not on file.     Social History Main Topics     Smoking status: Never Smoker     Smokeless tobacco: Not on file     Alcohol use Not on file     Drug use: Not on file     Sexual activity: Not on file     Other Topics Concern     Not on file     Social History Narrative       Family History: No family history on file.      Wilberto Brooke MD

## 2021-06-09 NOTE — PROGRESS NOTES
"Foreign Gleason is a 45 y.o. male who is being evaluated via a billable video visit.      The patient has been notified of following:     \"This video visit will be conducted via a call between you and your physician/provider. We have found that certain health care needs can be provided without the need for an in-person physical exam.  This service lets us provide the care you need with a video conversation.  If a prescription is necessary we can send it directly to your pharmacy.  If lab work is needed we can place an order for that and you can then stop by our lab to have the test done at a later time.    Video visits are billed at different rates depending on your insurance coverage. Please reach out to your insurance provider with any questions.    If during the course of the call the physician/provider feels a video visit is not appropriate, you will not be charged for this service.\"    Patient has given verbal consent to a Video visit? Yes    Will anyone else be joining your video visit? No        Video Start Time: 1:58 PM    Additional provider notes:   [facility] Video Note  Foreign Gleason   45 y.o. male    Date of Visit: 6/22/2020  Chief Complaint   Patient presents with     Depression     PHQ9 - 12     Medication Refill     Follow-up     Assessment/Plan  1. Erectile dysfunction  2. Essential hypertension  Counseled on potential for improvement in symptoms with improving his blood pressure.  Will his BP 2 to 3 hours after taking his medication at the same time consistently 3 days in a row.  If consistently greater than 120 mmHg, then will consider putting him on 20-12.5 or 20-25 mg regimens of the same medication  - sildenafil (REVATIO) 20 mg tablet; Take 3 tablets (60 mg total) by mouth once as needed.  Dispense: 90 tablet; Refill: 0    3. Current mild episode of major depressive disorder, unspecified whether recurrent (H)  Sees both a psychiatrist at [facility] as well as a " therapist.  No speak with his therapist about transitioning from the fluoxetine to Wellbutrin No. 2 minimize sexual side effects.  PHQ 9 stable at 12 today.  Updated that list to remove the mirtazapine.     Much or all of the text in this note was generated through the use of Dragon Dictate voice-to-text software. Errors in spelling or words which seem out of context are unintentional. Sound alike errors, in particular, may have escaped editing  Donovan Su MD    Return if symptoms worsen or fail to improve.    Subjective  This 45 y.o. old male. Not happy with his blood pressures. SBPs in the 140s and no symptoms. It tends to lower with taking the medication. Does not take amlodipine anymore. Has lost 10-15 lbs since last visit. Is walking with his family, portion control, avoiding sugar, less carbs. Wants to start biking. Now at 170 lb.  Goal is 155-160 lb.   Mood has not been great. States everyone tells him he is better with the medication. Not happy how the fluoxetine has destroyed his sex drive. Uses sildenafil PRN. States he has also discontinued his mirtazapine and increased his dose of fluoxetine.    ROS A comprehensive review of systems was performed and was otherwise negative    Medications, allergies, and problem list were reviewed and updated    Exam  General appearance: Pleasant, nontoxic-appearing, no acute distress, alert and oriented x4  There were no vitals filed for this visit.  GENERAL: Healthy, alert and no distress  EYES: Eyes grossly normal to inspection.    RESP: No audible wheeze, cough, or visible cyanosis.  No increased work of breathing.    PSYCH: Mentation appears normal, affect flat, judgement and insight intact, normal speech and appearance well-groomed    Additional Information   Current Outpatient Medications   Medication Sig Dispense Refill     CERTAVITE-ANTIOXIDANT  mg-mcg Tab Take 1 tablet by mouth daily.              cloNIDine HCl (CATAPRES) 0.1 MG tablet Take  0.1 mg by mouth 2 (two) times a day. If systolic BP is >160 or diastolic >110             FLUoxetine (PROZAC) 20 MG capsule Take 20 mg by mouth daily.       lisinopril-hydrochlorothiazide (PRINZIDE,ZESTORETIC) 10-12.5 mg per tablet Take 1 tablet by mouth daily. 90 tablet 3     sildenafil (REVATIO) 20 mg tablet Take 3 tablets (60 mg total) by mouth once as needed. 90 tablet 0     hydrOXYzine HCl (ATARAX) 25 MG tablet Take 25 mg by mouth at bedtime as needed.        No current facility-administered medications for this visit.      Allergies   Allergen Reactions     Green Tea Leaf Extract Anaphylaxis and Other (See Comments)     Says only happens with the brand Stash, throat swelling     Social History     Social History Narrative     Not on file     Social History     Tobacco Use     Smoking status: Never Smoker     Smokeless tobacco: Never Used   Substance Use Topics     Alcohol use: Not on file     Drug use: Not on file     Family History   Problem Relation Age of Onset     Heart disease Father         CABG X 4 in his 60s     Hypertension Brother      No past surgical history on file.    Time: total time spent with the patient was 20 minutes of which >50% was spent in counseling and coordination of care     Video-Visit Details    Type of service:  Video Visit    Video End Time (time video stopped): 2:18 PM  Originating Location (pt. Location): Home    Distant Location (provider location):  [facility]     Platform used for Video Visit: Lisa Su MD

## 2021-06-09 NOTE — TELEPHONE ENCOUNTER
Refill Approved    Rx renewed per Medication Renewal Policy. Medication was last renewed on 6/22/20.    Carissa Vaca, [facility] Connection Triage/Med Refill 7/20/2020     Requested Prescriptions   Pending Prescriptions Disp Refills     sildenafil (REVATIO) 20 mg tablet [Pharmacy Med Name: SILDENAFIL 20 MG TABLET] 90 tablet 0     Sig: TAKE 3 TABLETS (60 MG TOTAL) BY MOUTH ONCE AS NEEDED.       Medications for Impotence Refill Protocol Passed - 7/20/2020 12:11 AM        Passed - PCP or prescribing provider visit in last year     Last office visit with prescriber/PCP: 1/6/2020 Donovan Su MD OR same dept: Visit date not found OR same specialty: 1/6/2020 Donovan Su MD  Last physical: Visit date not found Last MTM visit: Visit date not found   Next visit within 3 mo: Visit date not found  Next physical within 3 mo: Visit date not found  Prescriber OR PCP: Donovan Su MD  Last diagnosis associated with med order: 1. Erectile dysfunction  - sildenafil (REVATIO) 20 mg tablet [Pharmacy Med Name: SILDENAFIL 20 MG TABLET]; Take 3 tablets (60 mg total) by mouth once as needed.  Dispense: 90 tablet; Refill: 0    If protocol passes may refill for 12 months if within 3 months of last provider visit (or a total of 15 months).

## 2021-06-10 NOTE — TELEPHONE ENCOUNTER
Refill Approved    Rx renewed per Medication Renewal Policy. Medication was last renewed on 7/20/20.    Louise Samayoa, [facility] Connection Triage/Med Refill 8/24/2020     Requested Prescriptions   Pending Prescriptions Disp Refills     sildenafil (REVATIO) 20 mg tablet [Pharmacy Med Name: SILDENAFIL 20 MG TABLET] 90 tablet 0     Sig: TAKE 3 TABLETS (60 MG TOTAL) BY MOUTH ONCE AS NEEDED.       Medications for Impotence Refill Protocol Passed - 8/20/2020  9:35 AM        Passed - PCP or prescribing provider visit in last year     Last office visit with prescriber/PCP: 1/6/2020 Donovan Su MD OR same dept: Visit date not found OR same specialty: 1/6/2020 Donovan Su MD  Last physical: Visit date not found Last MTM visit: Visit date not found   Next visit within 3 mo: Visit date not found  Next physical within 3 mo: Visit date not found  Prescriber OR PCP: Donovan Su MD  Last diagnosis associated with med order: 1. Erectile dysfunction  - sildenafil (REVATIO) 20 mg tablet [Pharmacy Med Name: SILDENAFIL 20 MG TABLET]; Take 3 tablets (60 mg total) by mouth once as needed.  Dispense: 90 tablet; Refill: 0    If protocol passes may refill for 12 months if within 3 months of last provider visit (or a total of 15 months).

## 2021-06-10 NOTE — PROGRESS NOTES
"Office Visit - Follow up    Foreign Gleason   42 y.o. male    Date of Visit: 5/15/2017    Chief Complaint   Patient presents with     Follow-up       Subjective: Pleasant 42-year-old gentleman who is now approximately 6 months sober.  Followed by Dr. Yang Hernandez and is treated with naltrexone and gabapentin.  Remains sober and is generally doing well however has not been as active with AA or other support services.    Insomnia is better on trazodone    Has concerns regarding erectile dysfunction and discussed use of sildenafil    Otherwise feels well without concerns we did have additional candid discussion about his sobriety    ROS: A comprehensive review of systems was performed and was otherwise negative except as mentioned above.     Exam  Generally negative and unchanged   /80  Pulse (!) 104  Ht 5' 9\" (1.753 m)  Wt 188 lb (85.3 kg)  BMI 27.76 kg/m2    Assessment and Plan  Substance abuse currently in remission in addition to erectile dysfunction    No other changes follow-up in 3 month    Foreign was seen today for follow-up.    Diagnoses and all orders for this visit:    Alcohol abuse    ED (erectile dysfunction)    Other orders  -     sildenafil (REVATIO) 20 mg tablet; Take 1 as needed for ed          Time: total time spent with the patient was 20 minutes of which >50% was spent in counseling and coordination of care        No Known Allergies    Medications :  Prior to Admission medications    Medication Sig Start Date End Date Taking? Authorizing Provider   atorvastatin (LIPITOR) 10 MG tablet Take 10 mg by mouth bedtime.   Yes PROVIDER, HISTORICAL   gabapentin (NEURONTIN) 300 MG capsule Take 1 capsule (300 mg total) by mouth 2 (two) times a day.  Patient taking differently: Take 300 mg by mouth daily.  2/21/17  Yes Wilberto Brooke MD   naltrexone (DEPADE) 50 mg tablet Take 1 tablet (50 mg total) by mouth daily. 2/2/17  Yes Wilberto Brooke MD   traZODone (DESYREL) 50 MG tablet Take 2 tablets (100 " mg total) by mouth bedtime. 2/21/17  Yes Wilberto Brooke MD   gabapentin (NEURONTIN) 300 MG capsule TAKE 1 CAPSULE (300 MG) BY MOUTH 3 TIMES DAILY 4/26/17 5/15/17 Yes PROVIDER, HISTORICAL   sildenafil (REVATIO) 20 mg tablet Take 1 as needed for ed 5/15/17   Wilberto Brooke MD        Past Medical History: No past medical history on file.    Past Surgical History: No past surgical history on file.    Social History:   Social History     Social History     Marital status:      Spouse name: N/A     Number of children: N/A     Years of education: N/A     Occupational History     Not on file.     Social History Main Topics     Smoking status: Never Smoker     Smokeless tobacco: Not on file     Alcohol use Not on file     Drug use: Not on file     Sexual activity: Not on file     Other Topics Concern     Not on file     Social History Narrative       Family History: No family history on file.      Wilberto Brooke MD

## 2021-06-10 NOTE — TELEPHONE ENCOUNTER
Pharmacy faxing stating - lisinopril-hydrochlorothiazide (PRINZIDE,ZESTORETIC) 10-12.5 mg per tablet is on back order.    They need separate RX for each medication or an alternative.    Pended for review    Please advise.    Aurora MONTE LPN .......... 2:23 PM  07/27/20  [facility] [facility]

## 2021-06-12 NOTE — TELEPHONE ENCOUNTER
RN cannot approve Refill Request    RN can NOT refill this medication  -->> PCP messaged that patient is overdue for Labs.  Last office visit: 1/6/2020 Donovan Su MD [facility], [facility] Triage/Med Refill 10/22/2020    Requested Prescriptions   Pending Prescriptions Disp Refills     lisinopriL (PRINIVIL,ZESTRIL) 10 MG tablet [Pharmacy Med Name: LISINOPRIL 10 MG TABLET] 90 tablet 0     Sig: TAKE 1 TABLET BY MOUTH EVERY DAY       Ace Inhibitors Refill Protocol Failed - 10/20/2020 12:06 AM        Failed - Serum Potassium in past 12 months     No results found for: LN-POTASSIUM          Failed - Serum Creatinine in past 12 months     No results found for: CREATININE          Passed - PCP or prescribing provider visit in past 12 months       Last office visit with prescriber/PCP: 1/6/2020 Donovan Su MD OR same dept: 1/6/2020 Donovan Su MD OR same specialty: 1/6/2020 Donovan Su MD  Last physical: Visit date not found Last MTM visit: Visit date not found   Next visit within 3 mo: Visit date not found  Next physical within 3 mo: Visit date not found  Prescriber OR PCP: Donovan Su MD  Last diagnosis associated with med order: 1. Essential hypertension  - lisinopriL (PRINIVIL,ZESTRIL) 10 MG tablet [Pharmacy Med Name: LISINOPRIL 10 MG TABLET]; TAKE 1 TABLET BY MOUTH EVERY DAY  Dispense: 90 tablet; Refill: 0  - hydroCHLOROthiazide (HYDRODIURIL) 12.5 MG tablet [Pharmacy Med Name: HYDROCHLOROTHIAZIDE 12.5 MG TB]; TAKE 1 TABLET BY MOUTH EVERY DAY  Dispense: 90 tablet; Refill: 0    If protocol passes may refill for 12 months if within 3 months of last provider visit (or a total of 15 months).             Passed - Blood pressure filed in past 12 months     BP Readings from Last 1 Encounters:   01/06/20 118/64                hydroCHLOROthiazide (HYDRODIURIL) 12.5 MG tablet [Pharmacy Med Name: HYDROCHLOROTHIAZIDE 12.5 MG TB] 90 tablet 0      Sig: TAKE 1 TABLET BY MOUTH EVERY DAY       Diuretics/Combination Diuretics Refill Protocol  Failed - 10/20/2020 12:06 AM        Failed - Serum Potassium in past 12 months      No results found for: LN-POTASSIUM          Failed - Serum Sodium in past 12 months      No results found for: LN-SODIUM          Failed - Serum Creatinine in past 12 months      No results found for: CREATININE          Passed - Visit with PCP or prescribing provider visit in past 12 months     Last office visit with prescriber/PCP: 1/6/2020 Donovan Su MD OR same dept: 1/6/2020 Donovan Su MD OR same specialty: 1/6/2020 Donovan Su MD  Last physical: Visit date not found Last MTM visit: Visit date not found   Next visit within 3 mo: Visit date not found  Next physical within 3 mo: Visit date not found  Prescriber OR PCP: Donovan Su MD  Last diagnosis associated with med order: 1. Essential hypertension  - lisinopriL (PRINIVIL,ZESTRIL) 10 MG tablet [Pharmacy Med Name: LISINOPRIL 10 MG TABLET]; TAKE 1 TABLET BY MOUTH EVERY DAY  Dispense: 90 tablet; Refill: 0  - hydroCHLOROthiazide (HYDRODIURIL) 12.5 MG tablet [Pharmacy Med Name: HYDROCHLOROTHIAZIDE 12.5 MG TB]; TAKE 1 TABLET BY MOUTH EVERY DAY  Dispense: 90 tablet; Refill: 0    If protocol passes may refill for 12 months if within 3 months of last provider visit (or a total of 15 months).             Passed - Blood pressure on file in past 12 months     BP Readings from Last 1 Encounters:   01/06/20 118/64

## 2021-06-12 NOTE — TELEPHONE ENCOUNTER
Please let patient know that he is due for future lipid cascade and BMP.  Lipid Cascade order is already in the system and please place for future BMP order.  Thank you

## 2021-06-12 NOTE — TELEPHONE ENCOUNTER
Please assist in lab only visit.    Thank you.    Aurora MONTE LPN .......... 2:40 PM  10/22/20  [facility]

## 2021-06-16 NOTE — TELEPHONE ENCOUNTER
Let patient know he needs appt per other encounter.     Dr. Reyes (covering [facility])

## 2021-06-16 NOTE — TELEPHONE ENCOUNTER
RN cannot approve Refill Request    RN can NOT refill this medication PCP messaged that patient is overdue for Labs. Last office visit: 1/6/2020 Donovan Su MD Last Physical: Visit date not found Last MTM visit: Visit date not found Last visit same specialty: 1/6/2020 Donovan Su MD.  Next visit within 3 mo: Visit date not found  Next physical within 3 mo: Visit date not found      Zeenat Briceño, [facility] Connection Triage/Med Refill 3/18/2021    Requested Prescriptions   Pending Prescriptions Disp Refills     lisinopriL (PRINIVIL,ZESTRIL) 10 MG tablet [Pharmacy Med Name: LISINOPRIL 10 MG TABLET] 90 tablet 0     Sig: TAKE 1 TABLET BY MOUTH EVERY DAY       Ace Inhibitors Refill Protocol Failed - 3/17/2021  6:34 PM        Failed - Serum Potassium in past 12 months     No results found for: LN-POTASSIUM          Failed - Blood pressure filed in past 12 months     BP Readings from Last 1 Encounters:   01/06/20 118/64             Failed - Serum Creatinine in past 12 months     No results found for: CREATININE          Passed - PCP or prescribing provider visit in past 12 months       Last office visit with prescriber/PCP: 1/6/2020 Donovan Su MD OR same dept: Visit date not found OR same specialty: 1/6/2020 Donovan Su MD  Last physical: Visit date not found Last MTM visit: Visit date not found   Next visit within 3 mo: Visit date not found  Next physical within 3 mo: Visit date not found  Prescriber OR PCP: Donovan Su MD  Last diagnosis associated with med order: 1. Essential hypertension  - lisinopriL (PRINIVIL,ZESTRIL) 10 MG tablet [Pharmacy Med Name: LISINOPRIL 10 MG TABLET]; TAKE 1 TABLET BY MOUTH EVERY DAY  Dispense: 90 tablet; Refill: 0  - hydroCHLOROthiazide (HYDRODIURIL) 12.5 MG tablet [Pharmacy Med Name: HYDROCHLOROTHIAZIDE 12.5 MG TB]; TAKE 1 TABLET BY MOUTH EVERY DAY  Dispense: 90 tablet; Refill: 0    If protocol passes may refill for  12 months if within 3 months of last provider visit (or a total of 15 months).                hydroCHLOROthiazide (HYDRODIURIL) 12.5 MG tablet [Pharmacy Med Name: HYDROCHLOROTHIAZIDE 12.5 MG TB] 90 tablet 0     Sig: TAKE 1 TABLET BY MOUTH EVERY DAY       Diuretics/Combination Diuretics Refill Protocol  Failed - 3/17/2021  6:34 PM        Failed - Serum Potassium in past 12 months      No results found for: LN-POTASSIUM          Failed - Serum Sodium in past 12 months      No results found for: LN-SODIUM          Failed - Blood pressure on file in past 12 months     BP Readings from Last 1 Encounters:   01/06/20 118/64             Failed - Serum Creatinine in past 12 months      No results found for: CREATININE          Passed - Visit with PCP or prescribing provider visit in past 12 months     Last office visit with prescriber/PCP: 1/6/2020 Donovan Su MD OR same dept: Visit date not found OR same specialty: 1/6/2020 Donovan Su MD  Last physical: Visit date not found Last MTM visit: Visit date not found   Next visit within 3 mo: Visit date not found  Next physical within 3 mo: Visit date not found  Prescriber OR PCP: Donovan Su MD  Last diagnosis associated with med order: 1. Essential hypertension  - lisinopriL (PRINIVIL,ZESTRIL) 10 MG tablet [Pharmacy Med Name: LISINOPRIL 10 MG TABLET]; TAKE 1 TABLET BY MOUTH EVERY DAY  Dispense: 90 tablet; Refill: 0  - hydroCHLOROthiazide (HYDRODIURIL) 12.5 MG tablet [Pharmacy Med Name: HYDROCHLOROTHIAZIDE 12.5 MG TB]; TAKE 1 TABLET BY MOUTH EVERY DAY  Dispense: 90 tablet; Refill: 0    If protocol passes may refill for 12 months if within 3 months of last provider visit (or a total of 15 months).

## 2021-06-16 NOTE — TELEPHONE ENCOUNTER
I am covering Georges, but it sounds like he wanted patient to come in for labs before refilling these and already indicated this. Georges is in on 3/22 and can eight in on if he'll bridge patient. Please let patient know - Dr. Reyes

## 2021-06-16 NOTE — TELEPHONE ENCOUNTER
RN cannot approve Refill Request    RN can NOT refill this medication PCP messaged that patient is overdue for Labs. Last office visit: 10/17/2019 Wilberto Brooke MD Last Physical: Visit date not found Last MTM visit: Visit date not found Last visit same specialty: 1/6/2020 Donovan Su MD.  Next visit within 3 mo: Visit date not found  Next physical within 3 mo: Visit date not found      Zeenat Briceño, Care Connection Triage/Med Refill 3/18/2021    Requested Prescriptions   Pending Prescriptions Disp Refills     lisinopriL-hydrochlorothiazide (PRINZIDE,ZESTORETIC) 10-12.5 mg per tablet [Pharmacy Med Name: LISINOPRIL-HCTZ 10-12.5 MG TAB] 90 tablet 2     Sig: TAKE 1 TABLET BY MOUTH EVERY DAY       Diuretics/Combination Diuretics Refill Protocol  Failed - 3/17/2021  6:36 PM        Failed - Serum Potassium in past 12 months      No results found for: LN-POTASSIUM          Failed - Serum Sodium in past 12 months      No results found for: LN-SODIUM          Failed - Blood pressure on file in past 12 months     BP Readings from Last 1 Encounters:   01/06/20 118/64             Failed - Serum Creatinine in past 12 months      No results found for: CREATININE          Passed - Visit with PCP or prescribing provider visit in past 12 months     Last office visit with prescriber/PCP: 10/17/2019 Wilberto Brooke MD OR same dept: Visit date not found OR same specialty: 1/6/2020 Donovan Su MD  Last physical: Visit date not found Last MTM visit: Visit date not found   Next visit within 3 mo: Visit date not found  Next physical within 3 mo: Visit date not found  Prescriber OR PCP: Wilberto Brooke MD  Last diagnosis associated with med order: 1. Essential hypertension  - lisinopriL-hydrochlorothiazide (PRINZIDE,ZESTORETIC) 10-12.5 mg per tablet [Pharmacy Med Name: LISINOPRIL-HCTZ 10-12.5 MG TAB]; TAKE 1 TABLET BY MOUTH EVERY DAY  Dispense: 90 tablet; Refill: 2    If protocol passes may refill for 12  months if within 3 months of last provider visit (or a total of 15 months).

## 2021-06-16 NOTE — TELEPHONE ENCOUNTER
See other enc - Called pt and LVM that pt is due for appointment with PCP -mailed letter to address on file

## 2021-06-21 NOTE — LETTER
Letter by Wilberto Brooke MD at      Author: Wilberto Brooke MD Service: -- Author Type: --    Filed:  Encounter Date: 3/17/2021 Status: (Other)       Foreign Gleason  [address] [place] [address]      03/19/21      Dear Foreign,      In reviewing your records, Donovan Su MD has determined an appointment is needed please call the clinic to schedule a:      Medication Check      Please call [phone], press option 2 to speak to clinical staff.    We believe that a strong preventative care program, including regular physicals and follow-up care is an important part of a healthy lifestyle and we are committed to helping you maintain your health.    Thank you for choosing us as your health care provider.    Sincerely,    Vani Brody   [facility] - CMT/CA  Donovan Su MD Care Team  [facility]  [address] [address]  [phone]

## 2021-07-03 NOTE — ADDENDUM NOTE
Addendum Note by Michael Loya LPN at 10/22/2020  2:41 PM     Author: Michael Loya LPN Service: -- Author Type: Licensed Nurse    Filed: 10/22/2020  2:41 PM Encounter Date: 10/20/2020 Status: Signed    : Michael Loya LPN (Licensed Nurse)    Addended by: MICHAEL LOYA on: 10/22/2020 02:41 PM        Modules accepted: Orders

## 2021-08-14 ENCOUNTER — HEALTH MAINTENANCE LETTER (OUTPATIENT)
Age: 46
End: 2021-08-14

## 2021-10-09 ENCOUNTER — HEALTH MAINTENANCE LETTER (OUTPATIENT)
Age: 46
End: 2021-10-09

## 2022-09-11 ENCOUNTER — HEALTH MAINTENANCE LETTER (OUTPATIENT)
Age: 47
End: 2022-09-11

## 2023-10-07 ENCOUNTER — HEALTH MAINTENANCE LETTER (OUTPATIENT)
Age: 48
End: 2023-10-07